# Patient Record
Sex: FEMALE | Race: BLACK OR AFRICAN AMERICAN | NOT HISPANIC OR LATINO | URBAN - METROPOLITAN AREA
[De-identification: names, ages, dates, MRNs, and addresses within clinical notes are randomized per-mention and may not be internally consistent; named-entity substitution may affect disease eponyms.]

---

## 2023-02-26 ENCOUNTER — INPATIENT (INPATIENT)
Facility: HOSPITAL | Age: 83
LOS: 8 days | Discharge: NURSING, REHAB OR LONG-TERM CARE | DRG: 315 | End: 2023-03-07
Attending: INTERNAL MEDICINE | Admitting: HOSPITALIST
Payer: MEDICARE

## 2023-02-26 VITALS
HEART RATE: 72 BPM | RESPIRATION RATE: 27 BRPM | TEMPERATURE: 98 F | DIASTOLIC BLOOD PRESSURE: 69 MMHG | OXYGEN SATURATION: 100 % | WEIGHT: 169.54 LBS | HEIGHT: 65 IN | SYSTOLIC BLOOD PRESSURE: 143 MMHG

## 2023-02-26 LAB
A1C WITH ESTIMATED AVERAGE GLUCOSE RESULT: 6.1 % — HIGH (ref 4–5.6)
ALBUMIN SERPL ELPH-MCNC: 3.5 G/DL — SIGNIFICANT CHANGE UP (ref 3.3–5)
ALBUMIN SERPL ELPH-MCNC: 3.8 G/DL — SIGNIFICANT CHANGE UP (ref 3.3–5)
ALP SERPL-CCNC: 56 U/L — SIGNIFICANT CHANGE UP (ref 40–120)
ALP SERPL-CCNC: 64 U/L — SIGNIFICANT CHANGE UP (ref 40–120)
ALT FLD-CCNC: 10 U/L — SIGNIFICANT CHANGE UP (ref 10–45)
ALT FLD-CCNC: 13 U/L — SIGNIFICANT CHANGE UP (ref 10–45)
ANION GAP SERPL CALC-SCNC: 11 MMOL/L — SIGNIFICANT CHANGE UP (ref 5–17)
ANION GAP SERPL CALC-SCNC: 9 MMOL/L — SIGNIFICANT CHANGE UP (ref 5–17)
ANISOCYTOSIS BLD QL: SLIGHT — SIGNIFICANT CHANGE UP
APPEARANCE UR: CLEAR — SIGNIFICANT CHANGE UP
APTT BLD: 30.6 SEC — SIGNIFICANT CHANGE UP (ref 27.5–35.5)
AST SERPL-CCNC: 16 U/L — SIGNIFICANT CHANGE UP (ref 10–40)
AST SERPL-CCNC: 17 U/L — SIGNIFICANT CHANGE UP (ref 10–40)
BACTERIA # UR AUTO: PRESENT /HPF
BASOPHILS # BLD AUTO: 0 K/UL — SIGNIFICANT CHANGE UP (ref 0–0.2)
BASOPHILS # BLD AUTO: 0.01 K/UL — SIGNIFICANT CHANGE UP (ref 0–0.2)
BASOPHILS NFR BLD AUTO: 0 % — SIGNIFICANT CHANGE UP (ref 0–2)
BASOPHILS NFR BLD AUTO: 0.2 % — SIGNIFICANT CHANGE UP (ref 0–2)
BILIRUB SERPL-MCNC: 0.2 MG/DL — SIGNIFICANT CHANGE UP (ref 0.2–1.2)
BILIRUB SERPL-MCNC: 0.2 MG/DL — SIGNIFICANT CHANGE UP (ref 0.2–1.2)
BILIRUB UR-MCNC: NEGATIVE — SIGNIFICANT CHANGE UP
BLD GP AB SCN SERPL QL: NEGATIVE — SIGNIFICANT CHANGE UP
BLD GP AB SCN SERPL QL: NEGATIVE — SIGNIFICANT CHANGE UP
BUN SERPL-MCNC: 39 MG/DL — HIGH (ref 7–23)
BUN SERPL-MCNC: 40 MG/DL — HIGH (ref 7–23)
CALCIUM SERPL-MCNC: 9.4 MG/DL — SIGNIFICANT CHANGE UP (ref 8.4–10.5)
CALCIUM SERPL-MCNC: 9.6 MG/DL — SIGNIFICANT CHANGE UP (ref 8.4–10.5)
CHLORIDE SERPL-SCNC: 106 MMOL/L — SIGNIFICANT CHANGE UP (ref 96–108)
CHLORIDE SERPL-SCNC: 107 MMOL/L — SIGNIFICANT CHANGE UP (ref 96–108)
CHOLEST SERPL-MCNC: 206 MG/DL — HIGH
CK MB CFR SERPL CALC: 1.6 NG/ML — SIGNIFICANT CHANGE UP (ref 0–6.7)
CK SERPL-CCNC: 73 U/L — SIGNIFICANT CHANGE UP (ref 25–170)
CO2 SERPL-SCNC: 25 MMOL/L — SIGNIFICANT CHANGE UP (ref 22–31)
CO2 SERPL-SCNC: 27 MMOL/L — SIGNIFICANT CHANGE UP (ref 22–31)
COLOR SPEC: YELLOW — SIGNIFICANT CHANGE UP
CREAT ?TM UR-MCNC: 95 MG/DL — SIGNIFICANT CHANGE UP
CREAT SERPL-MCNC: 1.71 MG/DL — HIGH (ref 0.5–1.3)
CREAT SERPL-MCNC: 1.8 MG/DL — HIGH (ref 0.5–1.3)
DIFF PNL FLD: NEGATIVE — SIGNIFICANT CHANGE UP
EGFR: 27 ML/MIN/1.73M2 — LOW
EGFR: 29 ML/MIN/1.73M2 — LOW
EOSINOPHIL # BLD AUTO: 0.23 K/UL — SIGNIFICANT CHANGE UP (ref 0–0.5)
EOSINOPHIL # BLD AUTO: 0.35 K/UL — SIGNIFICANT CHANGE UP (ref 0–0.5)
EOSINOPHIL NFR BLD AUTO: 4.2 % — SIGNIFICANT CHANGE UP (ref 0–6)
EOSINOPHIL NFR BLD AUTO: 6.2 % — HIGH (ref 0–6)
EPI CELLS # UR: ABNORMAL /HPF (ref 0–5)
ESTIMATED AVERAGE GLUCOSE: 128 MG/DL — HIGH (ref 68–114)
FERRITIN SERPL-MCNC: 191 NG/ML — HIGH (ref 15–150)
GIANT PLATELETS BLD QL SMEAR: PRESENT — SIGNIFICANT CHANGE UP
GLUCOSE BLDC GLUCOMTR-MCNC: 118 MG/DL — HIGH (ref 70–99)
GLUCOSE BLDC GLUCOMTR-MCNC: 125 MG/DL — HIGH (ref 70–99)
GLUCOSE BLDC GLUCOMTR-MCNC: 261 MG/DL — HIGH (ref 70–99)
GLUCOSE BLDC GLUCOMTR-MCNC: 281 MG/DL — HIGH (ref 70–99)
GLUCOSE SERPL-MCNC: 122 MG/DL — HIGH (ref 70–99)
GLUCOSE SERPL-MCNC: 180 MG/DL — HIGH (ref 70–99)
GLUCOSE UR QL: NEGATIVE — SIGNIFICANT CHANGE UP
HCT VFR BLD CALC: 27.4 % — LOW (ref 34.5–45)
HCT VFR BLD CALC: 29.4 % — LOW (ref 34.5–45)
HDLC SERPL-MCNC: 43 MG/DL — LOW
HGB BLD-MCNC: 8.8 G/DL — LOW (ref 11.5–15.5)
HGB BLD-MCNC: 9.6 G/DL — LOW (ref 11.5–15.5)
HYPOCHROMIA BLD QL: SLIGHT — SIGNIFICANT CHANGE UP
IMM GRANULOCYTES NFR BLD AUTO: 0.2 % — SIGNIFICANT CHANGE UP (ref 0–0.9)
INR BLD: 1.03 — SIGNIFICANT CHANGE UP (ref 0.88–1.16)
IRON SATN MFR SERPL: 28 % — SIGNIFICANT CHANGE UP (ref 14–50)
IRON SATN MFR SERPL: 62 UG/DL — SIGNIFICANT CHANGE UP (ref 30–160)
KETONES UR-MCNC: NEGATIVE — SIGNIFICANT CHANGE UP
LACTATE SERPL-SCNC: 0.5 MMOL/L — SIGNIFICANT CHANGE UP (ref 0.5–2)
LACTATE SERPL-SCNC: 0.5 MMOL/L — SIGNIFICANT CHANGE UP (ref 0.5–2)
LEUKOCYTE ESTERASE UR-ACNC: ABNORMAL
LIPID PNL WITH DIRECT LDL SERPL: 131 MG/DL — HIGH
LYMPHOCYTES # BLD AUTO: 1.44 K/UL — SIGNIFICANT CHANGE UP (ref 1–3.3)
LYMPHOCYTES # BLD AUTO: 2.47 K/UL — SIGNIFICANT CHANGE UP (ref 1–3.3)
LYMPHOCYTES # BLD AUTO: 25.7 % — SIGNIFICANT CHANGE UP (ref 13–44)
LYMPHOCYTES # BLD AUTO: 45.2 % — HIGH (ref 13–44)
MACROCYTES BLD QL: SLIGHT — SIGNIFICANT CHANGE UP
MAGNESIUM SERPL-MCNC: 1.8 MG/DL — SIGNIFICANT CHANGE UP (ref 1.6–2.6)
MAGNESIUM SERPL-MCNC: 2 MG/DL — SIGNIFICANT CHANGE UP (ref 1.6–2.6)
MANUAL SMEAR VERIFICATION: SIGNIFICANT CHANGE UP
MCHC RBC-ENTMCNC: 30.4 PG — SIGNIFICANT CHANGE UP (ref 27–34)
MCHC RBC-ENTMCNC: 31 PG — SIGNIFICANT CHANGE UP (ref 27–34)
MCHC RBC-ENTMCNC: 32.1 GM/DL — SIGNIFICANT CHANGE UP (ref 32–36)
MCHC RBC-ENTMCNC: 32.7 GM/DL — SIGNIFICANT CHANGE UP (ref 32–36)
MCV RBC AUTO: 94.8 FL — SIGNIFICANT CHANGE UP (ref 80–100)
MCV RBC AUTO: 94.8 FL — SIGNIFICANT CHANGE UP (ref 80–100)
MICROCYTES BLD QL: SLIGHT — SIGNIFICANT CHANGE UP
MONOCYTES # BLD AUTO: 0.2 K/UL — SIGNIFICANT CHANGE UP (ref 0–0.9)
MONOCYTES # BLD AUTO: 0.53 K/UL — SIGNIFICANT CHANGE UP (ref 0–0.9)
MONOCYTES NFR BLD AUTO: 3.5 % — SIGNIFICANT CHANGE UP (ref 2–14)
MONOCYTES NFR BLD AUTO: 9.7 % — SIGNIFICANT CHANGE UP (ref 2–14)
NEUTROPHILS # BLD AUTO: 2.21 K/UL — SIGNIFICANT CHANGE UP (ref 1.8–7.4)
NEUTROPHILS # BLD AUTO: 3.62 K/UL — SIGNIFICANT CHANGE UP (ref 1.8–7.4)
NEUTROPHILS NFR BLD AUTO: 40.5 % — LOW (ref 43–77)
NEUTROPHILS NFR BLD AUTO: 64.6 % — SIGNIFICANT CHANGE UP (ref 43–77)
NITRITE UR-MCNC: NEGATIVE — SIGNIFICANT CHANGE UP
NON HDL CHOLESTEROL: 163 MG/DL — HIGH
NRBC # BLD: 0 /100 WBCS — SIGNIFICANT CHANGE UP (ref 0–0)
OSMOLALITY UR: 369 MOSM/KG — SIGNIFICANT CHANGE UP (ref 300–900)
OVALOCYTES BLD QL SMEAR: SLIGHT — SIGNIFICANT CHANGE UP
PH UR: 5.5 — SIGNIFICANT CHANGE UP (ref 5–8)
PHOSPHATE SERPL-MCNC: 3.3 MG/DL — SIGNIFICANT CHANGE UP (ref 2.5–4.5)
PHOSPHATE SERPL-MCNC: 3.4 MG/DL — SIGNIFICANT CHANGE UP (ref 2.5–4.5)
PLAT MORPH BLD: ABNORMAL
PLATELET # BLD AUTO: 140 K/UL — LOW (ref 150–400)
PLATELET # BLD AUTO: 158 K/UL — SIGNIFICANT CHANGE UP (ref 150–400)
POIKILOCYTOSIS BLD QL AUTO: SLIGHT — SIGNIFICANT CHANGE UP
POLYCHROMASIA BLD QL SMEAR: SLIGHT — SIGNIFICANT CHANGE UP
POTASSIUM SERPL-MCNC: 4 MMOL/L — SIGNIFICANT CHANGE UP (ref 3.5–5.3)
POTASSIUM SERPL-MCNC: 4 MMOL/L — SIGNIFICANT CHANGE UP (ref 3.5–5.3)
POTASSIUM SERPL-SCNC: 4 MMOL/L — SIGNIFICANT CHANGE UP (ref 3.5–5.3)
POTASSIUM SERPL-SCNC: 4 MMOL/L — SIGNIFICANT CHANGE UP (ref 3.5–5.3)
PROT ?TM UR-MCNC: 26 MG/DL — HIGH (ref 0–12)
PROT SERPL-MCNC: 7 G/DL — SIGNIFICANT CHANGE UP (ref 6–8.3)
PROT SERPL-MCNC: 7.6 G/DL — SIGNIFICANT CHANGE UP (ref 6–8.3)
PROT UR-MCNC: ABNORMAL MG/DL
PROT/CREAT UR-RTO: 0.3 RATIO — HIGH (ref 0–0.2)
PROTHROM AB SERPL-ACNC: 12.3 SEC — SIGNIFICANT CHANGE UP (ref 10.5–13.4)
RAPID RVP RESULT: SIGNIFICANT CHANGE UP
RBC # BLD: 2.89 M/UL — LOW (ref 3.8–5.2)
RBC # BLD: 2.89 M/UL — LOW (ref 3.8–5.2)
RBC # BLD: 3.1 M/UL — LOW (ref 3.8–5.2)
RBC # FLD: 16.3 % — HIGH (ref 10.3–14.5)
RBC # FLD: 16.7 % — HIGH (ref 10.3–14.5)
RBC BLD AUTO: ABNORMAL
RBC CASTS # UR COMP ASSIST: < 5 /HPF — SIGNIFICANT CHANGE UP
RETICS #: 50.6 K/UL — SIGNIFICANT CHANGE UP (ref 25–125)
RETICS/RBC NFR: 1.8 % — SIGNIFICANT CHANGE UP (ref 0.5–2.5)
RH IG SCN BLD-IMP: POSITIVE — SIGNIFICANT CHANGE UP
RH IG SCN BLD-IMP: POSITIVE — SIGNIFICANT CHANGE UP
SARS-COV-2 RNA SPEC QL NAA+PROBE: SIGNIFICANT CHANGE UP
SCHISTOCYTES BLD QL AUTO: SLIGHT — SIGNIFICANT CHANGE UP
SMUDGE CELLS # BLD: PRESENT — SIGNIFICANT CHANGE UP
SODIUM SERPL-SCNC: 142 MMOL/L — SIGNIFICANT CHANGE UP (ref 135–145)
SODIUM SERPL-SCNC: 143 MMOL/L — SIGNIFICANT CHANGE UP (ref 135–145)
SODIUM UR-SCNC: 47 MMOL/L — SIGNIFICANT CHANGE UP
SP GR SPEC: 1.02 — SIGNIFICANT CHANGE UP (ref 1–1.03)
SPHEROCYTES BLD QL SMEAR: SLIGHT — SIGNIFICANT CHANGE UP
T4 AB SER-ACNC: 5.97 UG/DL — SIGNIFICANT CHANGE UP (ref 4.5–11.7)
TARGETS BLD QL SMEAR: SLIGHT — SIGNIFICANT CHANGE UP
TIBC SERPL-MCNC: 223 UG/DL — SIGNIFICANT CHANGE UP (ref 220–430)
TRIGL SERPL-MCNC: 158 MG/DL — HIGH
TROPONIN T SERPL-MCNC: 0.03 NG/ML — HIGH (ref 0–0.01)
TSH SERPL-MCNC: 3.52 UIU/ML — SIGNIFICANT CHANGE UP (ref 0.27–4.2)
UIBC SERPL-MCNC: 161 UG/DL — SIGNIFICANT CHANGE UP (ref 110–370)
URATE SERPL-MCNC: 5.2 MG/DL — SIGNIFICANT CHANGE UP (ref 2.5–7)
UROBILINOGEN FLD QL: 0.2 E.U./DL — SIGNIFICANT CHANGE UP
UUN UR-MCNC: 583 MG/DL — SIGNIFICANT CHANGE UP
WBC # BLD: 5.46 K/UL — SIGNIFICANT CHANGE UP (ref 3.8–10.5)
WBC # BLD: 5.6 K/UL — SIGNIFICANT CHANGE UP (ref 3.8–10.5)
WBC # FLD AUTO: 5.46 K/UL — SIGNIFICANT CHANGE UP (ref 3.8–10.5)
WBC # FLD AUTO: 5.6 K/UL — SIGNIFICANT CHANGE UP (ref 3.8–10.5)
WBC UR QL: > 10 /HPF

## 2023-02-26 PROCEDURE — 33016 PERICARDIOCENTESIS W/IMAGING: CPT

## 2023-02-26 PROCEDURE — 93308 TTE F-UP OR LMTD: CPT | Mod: 26

## 2023-02-26 PROCEDURE — 99233 SBSQ HOSP IP/OBS HIGH 50: CPT

## 2023-02-26 PROCEDURE — 99222 1ST HOSP IP/OBS MODERATE 55: CPT

## 2023-02-26 PROCEDURE — 71045 X-RAY EXAM CHEST 1 VIEW: CPT | Mod: 26

## 2023-02-26 PROCEDURE — 99223 1ST HOSP IP/OBS HIGH 75: CPT

## 2023-02-26 RX ORDER — PANTOPRAZOLE SODIUM 20 MG/1
40 TABLET, DELAYED RELEASE ORAL
Refills: 0 | Status: DISCONTINUED | OUTPATIENT
Start: 2023-02-26 | End: 2023-03-07

## 2023-02-26 RX ORDER — ACETAMINOPHEN 500 MG
1000 TABLET ORAL ONCE
Refills: 0 | Status: COMPLETED | OUTPATIENT
Start: 2023-02-26 | End: 2023-02-26

## 2023-02-26 RX ORDER — NIFEDIPINE 30 MG
60 TABLET, EXTENDED RELEASE 24 HR ORAL EVERY 24 HOURS
Refills: 0 | Status: DISCONTINUED | OUTPATIENT
Start: 2023-02-26 | End: 2023-02-27

## 2023-02-26 RX ORDER — DEXTROSE 50 % IN WATER 50 %
25 SYRINGE (ML) INTRAVENOUS ONCE
Refills: 0 | Status: DISCONTINUED | OUTPATIENT
Start: 2023-02-26 | End: 2023-03-07

## 2023-02-26 RX ORDER — ATORVASTATIN CALCIUM 80 MG/1
20 TABLET, FILM COATED ORAL AT BEDTIME
Refills: 0 | Status: DISCONTINUED | OUTPATIENT
Start: 2023-02-26 | End: 2023-03-07

## 2023-02-26 RX ORDER — CHLORHEXIDINE GLUCONATE 213 G/1000ML
1 SOLUTION TOPICAL
Refills: 0 | Status: DISCONTINUED | OUTPATIENT
Start: 2023-02-26 | End: 2023-03-05

## 2023-02-26 RX ORDER — DEXTROSE 50 % IN WATER 50 %
12.5 SYRINGE (ML) INTRAVENOUS ONCE
Refills: 0 | Status: DISCONTINUED | OUTPATIENT
Start: 2023-02-26 | End: 2023-03-07

## 2023-02-26 RX ORDER — SODIUM CHLORIDE 9 MG/ML
1000 INJECTION, SOLUTION INTRAVENOUS
Refills: 0 | Status: DISCONTINUED | OUTPATIENT
Start: 2023-02-26 | End: 2023-03-07

## 2023-02-26 RX ORDER — GLUCAGON INJECTION, SOLUTION 0.5 MG/.1ML
1 INJECTION, SOLUTION SUBCUTANEOUS ONCE
Refills: 0 | Status: DISCONTINUED | OUTPATIENT
Start: 2023-02-26 | End: 2023-03-07

## 2023-02-26 RX ORDER — INFLUENZA VIRUS VACCINE 15; 15; 15; 15 UG/.5ML; UG/.5ML; UG/.5ML; UG/.5ML
0.7 SUSPENSION INTRAMUSCULAR ONCE
Refills: 0 | Status: DISCONTINUED | OUTPATIENT
Start: 2023-02-26 | End: 2023-03-07

## 2023-02-26 RX ORDER — INSULIN LISPRO 100/ML
VIAL (ML) SUBCUTANEOUS
Refills: 0 | Status: DISCONTINUED | OUTPATIENT
Start: 2023-02-26 | End: 2023-03-07

## 2023-02-26 RX ORDER — DEXTROSE 50 % IN WATER 50 %
15 SYRINGE (ML) INTRAVENOUS ONCE
Refills: 0 | Status: DISCONTINUED | OUTPATIENT
Start: 2023-02-26 | End: 2023-03-07

## 2023-02-26 RX ADMIN — Medication 60 MILLIGRAM(S): at 07:09

## 2023-02-26 RX ADMIN — ATORVASTATIN CALCIUM 20 MILLIGRAM(S): 80 TABLET, FILM COATED ORAL at 22:28

## 2023-02-26 RX ADMIN — CHLORHEXIDINE GLUCONATE 1 APPLICATION(S): 213 SOLUTION TOPICAL at 06:21

## 2023-02-26 RX ADMIN — Medication 1000 MILLIGRAM(S): at 19:15

## 2023-02-26 RX ADMIN — Medication 6: at 22:27

## 2023-02-26 RX ADMIN — Medication 400 MILLIGRAM(S): at 19:00

## 2023-02-26 RX ADMIN — PANTOPRAZOLE SODIUM 40 MILLIGRAM(S): 20 TABLET, DELAYED RELEASE ORAL at 06:21

## 2023-02-26 NOTE — PROGRESS NOTE ADULT - SUBJECTIVE AND OBJECTIVE BOX
*incomplete* TU ADKINS 83y Female  MRN#: [id number]     SUBJECTIVE   Today is hospital day , and this morning she is reporting no SOB, CP, lightheadedness. In addition this patient does not report any nausea, vomiting or diarrhea  Overnight the patient noted to have early tamponade physiology per imaging w  RA early diastolic collapse, however RAND collapsable.     OBJECTIVE  PAST MEDICAL & SURGICAL HISTORY    Home Meds:    ALLERGIES:  Allergy Status Unknown    MEDICATIONS:  STANDING MEDICATIONS  atorvastatin 20 milliGRAM(s) Oral at bedtime  chlorhexidine 2% Cloths 1 Application(s) Topical <User Schedule>  influenza  Vaccine (HIGH DOSE) 0.7 milliLiter(s) IntraMuscular once  NIFEdipine XL 60 milliGRAM(s) Oral every 24 hours  pantoprazole    Tablet 40 milliGRAM(s) Oral before breakfast    PRN MEDICATIONS      VITAL SIGNS: Last 24 Hours  T(C): 37 (2023 09:50), Max: 37 (2023 09:50)  T(F): 98.6 (2023 09:50), Max: 98.6 (2023 09:50)  HR: 77 (2023 14:00) (60 - 83)  BP: 147/64 (2023 14:00) (123/61 - 161/71)  BP(mean): 92 (2023 14:00) (88 - 107)  RR: 20 (2023 14:00) (17 - 29)  SpO2: 100% (2023 14:00) (98% - 100%)    LABS:                        8.8    5.60  )-----------( 140      ( 2023 10:16 )             27.4         143  |  107  |  39<H>  ----------------------------<  122<H>  4.0   |  25  |  1.71<H>    Ca    9.4      2023 10:16  Phos  3.3       Mg     1.8         TPro  7.0  /  Alb  3.5  /  TBili  0.2  /  DBili  x   /  AST  16  /  ALT  10  /  AlkPhos  56  26    PT/INR - ( 2023 03:35 )   PT: 12.3 sec;   INR: 1.03          PTT - ( 2023 03:35 )  PTT:30.6 sec  Urinalysis Basic - ( 2023 07:55 )    Color: Yellow / Appearance: Clear / S.020 / pH: x  Gluc: x / Ketone: NEGATIVE  / Bili: Negative / Urobili: 0.2 E.U./dL   Blood: x / Protein: Trace mg/dL / Nitrite: NEGATIVE   Leuk Esterase: Moderate / RBC: < 5 /HPF / WBC > 10 /HPF   Sq Epi: x / Non Sq Epi: 5-10 /HPF / Bacteria: Present /HPF    Lactate, Blood: 0.5 mmol/L (23 @ 10:16)  Lactate, Blood: 0.5 mmol/L (23 @ 03:35)  Creatine Kinase, Serum: 73 U/L (23 @ 03:35)  Troponin T, Serum: 0.03 ng/mL *H* (23 @ 03:35)      CARDIAC MARKERS ( 2023 03:35 )  x     / 0.03 ng/mL / 73 U/L / x     / 1.6 ng/mL      PHYSICAL EXAM:  GENERAL: NAD, lying in bed comfortably  HEAD:  Atraumatic, Normocephalic  EYES: EOMI, PERRLA, conjunctiva and sclera clear  ENT: Moist mucous membranes  NECK: Supple, No JVD  CHEST/LUNG: Clear to auscultation bilaterally; No rales, rhonchi, wheezing, or rubs. Unlabored respirations  HEART: Regular rate and rhythm; No murmurs, rubs, or gallops  ABDOMEN: Bowel sounds present; Soft, Nontender, Nondistended. No hepatomegaly  EXTREMITIES:  2+ Peripheral Pulses, brisk capillary refill. No clubbing, cyanosis, or edema  NERVOUS SYSTEM:  Alert & Oriented X3, speech clear. No deficits   MSK: FROM all 4 extremities, full and equal strength  SKIN: No rashes or lesions

## 2023-02-26 NOTE — PATIENT PROFILE ADULT - NSPROHMSYMPCOND_GEN_A_NUR
Please review. Protocol Failed or has No Protocol.     Requested Prescriptions   Pending Prescriptions Disp Refills    HYDROCHLOROTHIAZIDE 25 MG Oral Tab [Pharmacy Med Name: hydroCHLOROthiazide Oral Tablet 25 MG] 90 tablet 0     Sig: TAKE 1 TABLET BY MOUTH DAILY        Hypertensive Medications Protocol Failed - 9/10/2022  9:53 AM        Failed - Last BP reading less than 140/90     BP Readings from Last 1 Encounters:  07/21/22 : (!) 145/94                Passed - In person appointment in the past 12 or next 3 months       Recent Outpatient Visits              1 month ago Subacute cough    [facility], [place] [facility]    Office Visit    5 months ago Encounter for annual health examination    [facility], [address] [place], [place] DO    Office Visit    1 year ago Nail dystrophy    [facility], [address] [facility],[address], [address] [facility] [place] D, [facility]    Office Visit    1 year ago Essential hypertension    [facility], [place], [place] DO    Office Visit    2 years ago Essential hypertension    [facility], [place], [place]    Telemedicine                 Passed - CMP or BMP in past 6 months     Recent Results (from the past 4392 hour(s))   COMP METABOLIC PANEL (14)    Collection Time: 03/15/22 12:16 PM   Result Value Ref Range    Glucose 85 70 - 99 mg/dL    Sodium 136 136 - 145 mmol/L    Potassium 3.1 (L) 3.5 - 5.1 mmol/L    Chloride 103 98 - 112 mmol/L    CO2 28.0 21.0 - 32.0 mmol/L    Anion Gap 5 0 - 18 mmol/L    BUN 19 (H) 7 - 18 mg/dL    Creatinine 1.06 0.70 - 1.30 mg/dL    BUN/CREA Ratio 17.9 10.0 - 20.0    Calcium, Total 9.3 8.5 - 10.1 mg/dL    Calculated Osmolality 284 275 - 295 mOsm/kg    GFR, Non- 72 >=60    GFR, -American 83 >=60    ALT 29 16 - 61 U/L    AST 18 15 - 37 U/L    Alkaline Phosphatase 43 (L) 45 - 117 U/L    Bilirubin, Total 0.9 0.1 - 2.0 mg/dL    Total Protein 7.3 6.4 - 8.2 g/dL    Albumin 4.0 3.4 - 5.0 g/dL    Globulin  3.3 2.8 - 4.4 g/dL    A/G Ratio 1.2 1.0 - 2.0    Patient Fasting for CMP? Yes      *Note: Due to a large number of results and/or encounters for the requested time period, some results have not been displayed. A complete set of results can be found in Results Review.                  Passed - In person appointment or virtual visit in the past 6 months       Recent Outpatient Visits              1 month ago Subacute cough    [facility], [address], [place], [place], [place]    Office Visit    5 months ago Encounter for annual health examination    [facility], [address], [place], [place], DO    Office Visit    1 year ago Nail dystrophy    [facility], [address], [address] [facility], DPM    Office Visit    1 year ago Essential hypertension    [facility], [address], [place], [place], DO    Office Visit    2 years ago Essential hypertension    [facility], [address], [place], [address] - GFR > 50     No results found for: [facility]                    Recent Outpatient Visits              1 month ago Subacute cough    [facility], [address], [place], [place], DO    Office Visit    5 months ago Encounter for annual health examination    [facility], [address], [place], [place], DO    Office Visit    1 year ago Nail dystrophy    [facility], [address], [address], [place], DPM    Office Visit    1 year ago Essential hypertension    [facility], [address], [place], [place], DO    Office Visit    2 years ago Essential hypertension    [facility], [address], [place], [place], [place]    Telemedicine cardiovascular/diabetes/sleep

## 2023-02-26 NOTE — H&P ADULT - ASSESSMENT
82 y/o F PMH CHF (unknown previous EF), HTN, DM, PVD, stage 3CKD, CHF, CATE (~4L O2 at home) gout, recurrent pericardial effusion, unclear smoking hx, who presented to [facility] for hypoglycemia, found to have pericardial effusion, transfferred to [facility] for further management.      NEURO  JOSE J    CV  #Pericardial effusion    IVC collapsable, RA early diastolic collapse  early cardiac tamponade physiology      #PVD  Hx of LE PVD. On [id number] and Atorvastatin 20mg. Per pt, no hx of stents. No current symptoms.   Plan:  -c/w Atorvastatin 20mg qd      PULM      GI      RENAL  #CKD stage III      HEME/ONC      ID      F:As needed  E:replete prn  N:NPO  A:   82 y/o F PMH CHF (unknown previous EF), HTN, DM, PVD, stage 3CKD, CHF, CATE (~4L O2 at home) gout, recurrent pericardial effusion, unclear smoking hx, who presented to [facility] for hypoglycemia, found to have pericardial effusion, transferred to [facility] for further management.      NEURO  JOSE J      CV  #Pericardial effusion  Pt transferred to [facility] with incidental pericardial effusion found at [facility]. Per daughter at bedside, pt underwent pericardiocentesis in 07/2021 in [place].  Bedside echo 2/26: IVC collapsable, RA early diastolic collapse, early cardiac tamponade physiology.  Plan:  -interventional cardiology management for      #PVD  Hx of LE PVD. On [id number] and Atorvastatin 20mg. Per pt, no hx of stents. No current symptoms.   Plan:  -c/w Atorvastatin 20mg qd      PULM  #CATE  Pt with hx of CATE, on home O2, around 4L  Plan:  -satting 99% on NC 2L,   -c/w RA      GI  JOSE J    RENAL  #CKD stage III        HEME/ONC      ID      F:As needed  E:replete prn  N:NPO  A:   82 y/o F PMH CHF (unknown previous EF), HTN, DM, PVD, stage 3CKD, CHF, CATE (~4L O2 at home) gout, recurrent pericardial effusion, unclear smoking hx, who presented to [facility] for hypoglycemia, found to have pericardial effusion, transferred to [facility] for further management.      NEURO  JOSE J      CV  #Pericardial effusion  Pt transferred to [facility] with incidental pericardial effusion found at [facility]. Per daughter at bedside, pt underwent pericardiocentesis in 07/2021 in [place].  Bedside echo 2/26: IVC collapsable, RA early diastolic collapse, early cardiac tamponade physiology.  Plan:  -interventional cardiology management for potential pericardiocentesis vs pericardial window  -hold lasix     #CHF  Pt with hx of CHF, unknown last EF. On home carvedilol 12.5 mg BID, enalapril 10mg. BNP and trop WNL at [place]. Not in current exacerbation. Extremities WWP.  Plan:  -hold home carvedilol and enalapril      #PVD  Hx of LE PVD. On [id number] and Atorvastatin 20mg. Per pt, no hx of stents. No current symptoms.   Plan:  -c/w Atorvastatin 20mg qd  -hold ASA for potential procedure tomorrow      #HTN  Pt with hx of HTN, on home nifedipine 60mg daily  -c/w nifedipine 60mg daily    #Elevated troponin  pt with trop 0.03, denies CP, no ischemic findings on EKG. CK, CKMB WNL.  Plan:  -monitor daily EKG    PULM  #CATE  Pt with hx of CATE, on home O2, around 4L  Plan:  -satting 99% on NC 2L,   -c/w RA      GI  #GERD  Pt with hx of GERD, on home pantoprazole 40mg daily  Plan:  -c.w pantoprazole 40mg daily      RENAL  #CKD stage III  Pt with Cr 1.8 on admission, 2.13 today at [facility].  Plan:  -trend BUN/Cr daily      HEME/ONC  #Anemia  Hgb 9.6, likely from CKD. No signs of active bleeding. Denies melana, hamturia, hemoptysis.  Plan:  -f/u iron panel  -maintain active T&S  -transfuse hgb<7    ID  JOSE J    F:As needed  E:replete prn  N:NPO  A:none   82 y/o F PMH CHF (unknown previous EF), HTN, DM, PVD, stage 3CKD, CHF, CATE (~4L O2 at home) gout, recurrent pericardial effusion, unclear smoking hx, who presented to [facility] for hypoglycemia, found to have pericardial effusion, transferred to [facility] for further management.      NEURO  JOSE J      CV  #Pericardial effusion  Pt transferred to [facility] with incidental pericardial effusion found at [facility]. Per daughter at bedside, pt underwent pericardiocentesis in 07/2021 in [place]. On home lasix 40mg BID. Unknown etiology. Pt appears euvolemic.  Bedside echo 2/26: IVC collapsable, RA early diastolic collapse, early cardiac tamponade physiology.  Plan:  -interventional cardiology management for potential pericardiocentesis vs pericardial window  -hold lasix       #CHF  Pt with hx of CHF, unknown last EF. On home carvedilol 12.5 mg BID, enalapril 10mg. BNP and trop WNL at [place]. Not in current exacerbation. Extremities WWP.  Plan:  -hold home carvedilol and enalapril      #PVD  Hx of LE PVD. On [id number] and Atorvastatin 20mg. Per pt, no hx of stents. No current symptoms.   Plan:  -c/w Atorvastatin 20mg qd  -hold ASA for potential procedure tomorrow      #HTN  Pt with hx of HTN, on home nifedipine 60mg daily  -c/w nifedipine 60mg daily    #Elevated troponin  pt with trop 0.03, denies CP, no ischemic findings on EKG. CK, CKMB WNL.  Plan:  -monitor daily EKG    PULM  #CATE  Pt with hx of CATE, on home O2, around 4L  Plan:  -satting 99% on NC 2L,   -c/w RA      GI  #GERD  Pt with hx of GERD, on home pantoprazole 40mg daily  Plan:  -c.w pantoprazole 40mg daily      RENAL  #CKD stage III  Pt with Cr 1.8 on admission, 2.13 today at [place].  Plan:  -trend BUN/Cr daily    ENDO  #Dm  Admitted to [place] for hypoglycemia to reported 49mg/dl, 39mg/dL, then 29 mg/dL on home finger stick. On home glipizide 10mg qd, Sitagliptin 50mg.  Plan:  -started on sliding scale lispro  -monitor FSG q6    HEME/ONC  #Anemia  Hgb 9.6, likely from CKD. No signs of active bleeding. Denies melana, hamturia, hemoptysis.  Plan:  -f/u iron panel  -maintain active T&S  -transfuse hgb<7    ID  JOSE J    F:As needed  E:replete prn  N:NPO  A:none

## 2023-02-26 NOTE — H&P ADULT - HISTORY OF PRESENT ILLNESS
82 y/o F PMH CHF (unknown previous EF), HTN, DM, stage 3CKD, CHF, CATE (~4L O2 at home) gout, recurrent pericardial efffusion, unclear smoking hx, who presented to [facility] for weakness, near syncopal episode, decreased appetite, dizziness, warmth, and nausea, reporting fingertstick at home 49mg/dl, 39mg/dL, then 29 mg/dL, admitted to [place] for hypoglycemia. Pt developed tachypnea in ED, PE showed BL crackes, bedside echo at [place] showed pericardial effusion. Vitals at [place] during admission /63, HR 85, O2 95%. EKG showed NSR, LBBB and electrical alternans which was confirmed by telemetry. Pt remained HDS at [place].    Per daughter at bedside, pt last had an episode of pericardial effusion in 07/2021 in [place] and underwent pericardiocentesis with an unknown etiology. Of note, pt was recently hospitalized on 2/13 at [facility] for SOB, BL LE edema, her home lasix 40mg was increased to q12 from qd, limited by Cr/BUN 2.13, 52. Transferred to [facility] for pericardiocentesis vs pericardial window.     EKG: WV interval 296, 1st degree AV block, PVCs   84 y/o F PMH CHF (unknown previous EF), HTN, DM, PVD, stage 3CKD, CHF, CATE (~4L O2 at home) gout, recurrent pericardial effusion, unclear smoking hx, who presented to [facility] for weakness, near syncopal episode, decreased appetite, dizziness, warmth, and nausea, reporting fingerstick at home 49mg/dl, 39mg/dL, then 29 mg/dL, admitted to [place] for hypoglycemia. Pt developed tachypnea in ED, PE showed BL crackles, bedside echo at [place] showed pericardial effusion. Vitals at [place] during admission /63, HR 85, O2 95%. EKG showed NSR, LBBB and electrical alternans which was confirmed by telemetry BNP 75, Cr 2.13/BUN 52, Trop T 13.7,  Pt remained HDS at [place].      Per daughter at bedside, pt last had an episode of pericardial effusion in 07/2021 in [place] and underwent pericardiocentesis with an unknown etiology. Of note, pt was recently hospitalized on 2/13 at [facility] for SOB, BL LE edema, her home lasix 40mg was increased to q12 from qd, limited by Cr/BUN 2.13/ 52. Transferred to [facility] for further management with pericardiocentesis vs pericardial window.     EKG: MN interval 296, 1st degree AV block, PVCs   84 y/o F PMH CHF (unknown previous EF), HTN, DM, PVD, stage 3CKD, CHF, CATE (~4L O2 at home) gout, recurrent pericardial effusion, unclear smoking hx, who presented to [facility] for weakness, near syncopal episode, decreased appetite, dizziness, warmth, and nausea, reporting fingerstick at home 49mg/dl, 39mg/dL, then 29 mg/dL, admitted to [place] for hypoglycemia. Pt developed tachypnea in ED, PE showed BL crackles, bedside echo at [place] showed pericardial effusion. Vitals at [place] during admission /63, HR 85, O2 95%. EKG showed NSR, LBBB and electrical alternans which was confirmed by telemetry BNP 75, Cr 2.13/BUN 52, Trop T 13.7,  Pt remained HDS at [place].      Per daughter at bedside, pt last had an episode of pericardial effusion in 07/2021 in [place] and underwent pericardiocentesis with an unknown etiology. Of note, pt was recently hospitalized on 2/13 at [facility] for SOB, BL LE edema, her home lasix 40mg was increased to q12 from qd, limited by Cr/BUN 2.13/ 52. Transferred to [facility] for further management with pericardiocentesis vs pericardial window.       EKG: HI interval 296, 1st degree AV block, PVCs

## 2023-02-26 NOTE — CONSULT NOTE ADULT - ASSESSMENT
Assesment:  82 y/o F PMH CHF (unknown previous EF), HTN, DM, PVD, stage 3CKD, CHF, CATE (~4L O2 at home) gout, recurrent pericardial effusion, former smoking hx, who presented to [facility] for weakness, near syncopal episode, decreased appetite, dizziness, warmth, and nausea, reporting fingerstick at home 49mg/dl, 39mg/dL, then 29 mg/dL, admitted to [place] for hypoglycemia. Patient developed tachypnea in ED, noted to have BL crackles, bedside echo at [place] showed large pericardial effusion. Vitals at [place] during admission /63, HR 85, O2 95%. EKG showed NSR, LBBB and electrical alternans which was confirmed by telemetry BNP 75, Cr 2.13/BUN 52, Trop T 13.7,  Patient remained HDS at [place].  Per daughter at bedside, patient last had an episode of pericardial effusion in 07/2021 in [place] and underwent pericardiocentesis with an unknown etiology. Of note, pt was recently hospitalized on 2/13 at [facility] for SOB, BL LE edema, her home lasix 40mg was increased to q12 from qd, limited by Cr/BUN 2.13/ 52. Transferred to [facility] for further management of pericardial effusion.  Structural heart consulted for evaluation of pericardial effusion.    Plan:  Problem 1: Large pericardial effusion  ECHO reviewed with Dr. Lee  There is not an adequate window to drain effusion.  There is also no evidence of tamponade physiology  Recommend repeat echo on monday, Feb 27th to evaluate for worsening effusion  Will continue to follow    Problem 2: Diabetes  Recommend insulin sliding scale while in house    Problem 3: Hypertension  Recommend caution with antihypertensives given pericardial effusion    Problem 4: Hyperlipidemia  Continue statin    I have reviewed clinical labs tests and reports, radiology tests and reports, as well as old patient medical records, and discussed with the referring physician.     Assesment:  84 y/o F PMH CHF (unknown previous EF), HTN, DM, PVD, stage 3CKD, CHF, CATE (~4L O2 at home) gout, recurrent pericardial effusion, former smoking hx, who presented to [facility] for weakness, near syncopal episode, decreased appetite, dizziness, warmth, and nausea, reporting fingerstick at home 49mg/dl, 39mg/dL, then 29 mg/dL, admitted to [place] for hypoglycemia. Patient developed tachypnea in ED, noted to have BL crackles, bedside echo at [place] showed large pericardial effusion. Vitals at [place] during admission /63, HR 85, O2 95%. EKG showed NSR, LBBB and electrical alternans which was confirmed by telemetry BNP 75, Cr 2.13/BUN 52, Trop T 13.7,  Patient remained HDS at [place].  Per daughter at bedside, patient last had an episode of pericardial effusion in 07/2021 in [place] and underwent pericardiocentesis with an unknown etiology. Of note, pt was recently hospitalized on 2/13 at [facility] for SOB, BL LE edema, her home lasix 40mg was increased to q12 from qd, limited by Cr/BUN 2.13/ 52. Transferred to [facility] for further management of pericardial effusion.  Structural heart consulted for evaluation of pericardial effusion.    Plan:  Problem 1: Large pericardial effusion  ECHO reviewed with Dr. Lee. On repeat bedside here at [facility], effusion appears larger and with echocardiographic evidence of tamponade. Good window to tap. Family and pt aware and agree. Procedure to be done in the cath lab  Will continue to follow    Problem 2: Diabetes  Recommend insulin sliding scale while in house    Problem 3: Hypertension  Recommend caution with antihypertensives given pericardial effusion    Problem 4: Hyperlipidemia  Continue statin    I have reviewed clinical labs tests and reports, radiology tests and reports, as well as old patient medical records, and discussed with the referring physician.

## 2023-02-26 NOTE — H&P ADULT - NSHPPHYSICALEXAM_GEN_ALL_CORE
GENERAL: NAD, lying in bed comfortably  HEAD:  Atraumatic, Normocephalic  EYES: EOMI, PERRLA, conjunctiva and sclera clear  ENT: Moist mucous membranes  NECK: Supple, No JVD  CHEST/LUNG: Clear to auscultation bilaterally; No rales, rhonchi, wheezing, or rubs. Unlabored respirations  HEART: Regular rate and rhythm; No murmurs, rubs, or gallops  ABDOMEN: Bowel sounds present; Soft, Nontender, Nondistended. No hepatomegally  EXTREMITIES:  2+ Peripheral Pulses, brisk capillary refill. No clubbing, cyanosis, or edema  NERVOUS SYSTEM:  Alert & Oriented X3, speech clear. No deficits   MSK: FROM all 4 extremities, full and equal strength  SKIN: No rashes or lesions GENERAL: NAD, lying in bed comfortably  HEAD:  Atraumatic, Normocephalic  EYES: EOMI, PERRLA, conjunctiva and sclera clear  ENT: Moist mucous membranes  NECK: Supple, No JVD  CHEST/LUNG: Clear to auscultation bilaterally; No rales, rhonchi, wheezing, or rubs. Unlabored respirations  HEART: Regular rate and rhythm; No murmurs, rubs, or gallops  ABDOMEN: Bowel sounds present; Soft, Nontender, Nondistended. No hepatomegaly  EXTREMITIES:  2+ Peripheral Pulses, brisk capillary refill. No clubbing, cyanosis, or edema  NERVOUS SYSTEM:  Alert & Oriented X3, speech clear. No deficits   MSK: FROM all 4 extremities, full and equal strength  SKIN: No rashes or lesions

## 2023-02-26 NOTE — H&P ADULT - NSHPSOCIALHISTORY_GEN_ALL_CORE
Uses walker at home, lives at home alone, denies smoking hx, per chart review from [facility], 40 pack year quit >30 yrs ago Uses walker at home, lives at home alone, denies smoking hx, per chart review from [facility], 40 pack-year quit >30 yrs ago

## 2023-02-26 NOTE — CONSULT NOTE ADULT - SUBJECTIVE AND OBJECTIVE BOX
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 Patient developed tachypnea in ED, noted to have BL crackles, bedside echo at [facility] showed large pericardial effusion. Vitals at [facility] during admission /63, HR 85, O2 95%. EKG showed NSR, LBBB and electrical alternans which was confirmed by telemetry BNP 75, Cr 2.13/BUN 52, Trop T 13.7,  Patient remained HDS at [facility].  Per daughter at bedside, patient last had an episode of pericardial effusion in 2021 in [place] and underwent pericardiocentesis with an unknown etiology. Of note, pt was recently hospitalized on  at [facility] for SOB, BL LE edema, her home lasix 40mg was increased to q12 from qd, limited by Cr/BUN 2.13/ 52. Transferred to [facility] for further management of pericardial effusion.  Structural heart consulted for evaluation of pericardial effusion.      PAST MEDICAL & SURGICAL HISTORY:      MEDICATIONS  (STANDING):  atorvastatin 20 milliGRAM(s) Oral at bedtime  chlorhexidine 2% Cloths 1 Application(s) Topical <User Schedule>  influenza  Vaccine (HIGH DOSE) 0.7 milliLiter(s) IntraMuscular once  NIFEdipine XL 60 milliGRAM(s) Oral every 24 hours  pantoprazole    Tablet 40 milliGRAM(s) Oral before breakfast    MEDICATIONS  (PRN):      Allergies    Allergy Status Unknown    Intolerances        SOCIAL HISTORY:  Smoker:  Former smoker 30 pack year history  ETOH use:  NO              Ilicit Drug use:  NO      FAMILY HISTORY:      Review of Systems (Need 10):  CONSTITUTIONAL: Denies fevers / chills, sweats, fatigue, weight loss, weight gain                                       NEURO:  Denies parathesias, seizures, syncope, confusion                                                                                  EYES:  Denies blurry vision, discharge, pain, loss of vision                                                                                    ENMT:  Denies difficulty hearing, vertigo, dysphagia, epistaxis, recent dental work                                       CV:  Denies chest pain, palpitations, [place], orthopnea                                                                                           RESPIRATORY:  Denies wWheezing, SOB, cough / sputum, hemoptysis                                                               GI:  Denies nausea, vomiting, diarrhea, constipation, melena                                                                          : Denies hematuria, dysuria, urgency, incontinence                                                                                          MUSKULOSKELETAL:  Denies arthritis, joint swelling, muscle weakness                                                             SKIN/BREAST:  Denies rash, itching, hair loss, masses                                                                                              PSYCH:  Denies depression, anxiety, suicidal ideation                                                                                                HEME/LYMPH:  Denies bruises easily, enlarged lymph nodes, tender lymph nodes                                          ENDOCRINE:  Denies cold intolerance, heat intolerance, polydipsia                                                                      Vital Signs Last 24 Hrs  T(C): 37 (2023 09:50), Max: 37 (2023 09:50)  T(F): 98.6 (2023 09:50), Max: 98.6 (2023 09:50)  HR: 83 (2023 12:00) (60 - 83)  BP: 147/89 (2023 12:00) (123/61 - 161/71)  BP(mean): 107 (2023 12:00) (88 - 107)  RR: 22 (2023 12:00) (17 - 29)  SpO2: 98% (2023 12:00) (98% - 100%)    Parameters below as of 2023 12:00  Patient On (Oxygen Delivery Method): nasal cannula  O2 Flow (L/min): 2      Physical Exam (Need 8)  CONSTITUTIONAL:                                                                          WNL  NEURO:                                                                                             WNL                      EYES:                                                                                                  WNL  ENMT:                                                                                                WNL  CV:                                                                                                      WNL  RESPIRATORY:                                                                                  WNL  GI:                                                                                                       WNL  : [place] + / -                                                                                 WNL  MUSKULOSKELETAL:                                                                       WNL  SKIN / BREAST:                                                                                 WNL                                                          LABS:                        8.8    5.60  )-----------( 140      ( 2023 10:16 )             27.4     02-    143  |  107  |  39<H>  ----------------------------<  122<H>  4.0   |  25  |  1.71<H>    Ca    9.4      2023 10:16  Phos  3.3     02-  Mg     1.8         TPro  7.0  /  Alb  3.5  /  TBili  0.2  /  DBili  x   /  AST  16  /  ALT  10  /  AlkPhos  56      PT/INR - ( 2023 03:35 )   PT: 12.3 sec;   INR: 1.03          PTT - ( 2023 03:35 )  PTT:30.6 sec  Urinalysis Basic - ( 2023 07:55 )    Color: Yellow / Appearance: Clear / S.020 / pH: x  Gluc: x / Ketone: NEGATIVE  / Bili: Negative / Urobili: 0.2 E.U./dL   Blood: x / Protein: Trace mg/dL / Nitrite: NEGATIVE   Leuk Esterase: Moderate / RBC: < 5 /HPF / WBC > 10 /HPF   Sq Epi: x / Non Sq Epi: 5-10 /HPF / Bacteria: Present /HPF      CARDIAC MARKERS ( 2023 03:35 )  x     / 0.03 ng/mL / 73 U/L / x     / 1.6 ng/mL          RADIOLOGY & ADDITIONAL STUDIES:  CAROTID U/S:    CXR:    CT Scan:    EKG:    TTE / DALJIT:    Cardiac Cath:

## 2023-02-26 NOTE — CONSULT NOTE ADULT - NS ATTEND AMEND GEN_ALL_CORE FT
Agree with above with the following comments.    Pt seen and evaluated at bedside. Daughter present in room. Pt remains hemodynamically stable. Bedside echo showing larger effusion than echo at outside institution. Now with evidence of tamponade on echo. Pt complaining of discomfort in chest. Advised pt about pericardial drainage. She agrees to the procedure. Will perform in the cath lab. Sample of fluid to be sent for analysis. Drain will be left in to monitor for further fluid.

## 2023-02-26 NOTE — PATIENT PROFILE ADULT - FALL HARM RISK - FACTORS
IV and/or equipment tethered to patient/Medication side effects/Other medical problems/Weakness/Other

## 2023-02-26 NOTE — PROGRESS NOTE ADULT - ASSESSMENT
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      NEURO  JOSE J      CV  #Pericardial effusion  Pt transferred to [facility] with incidental pericardial effusion found at [facility]. Per daughter at bedside, pt underwent pericardiocentesis in 07/2021 in [place]. On home lasix 40mg BID. Unknown etiology. Pt appears euvolemic.  Bedside echo 2/26: IVC collapsable, RA early diastolic collapse, early cardiac tamponade physiology.    Plan:  - Plan for Pericardial Drain (Interventional cardiology consulted for procedure)  - CT surgery initially talked to for potential placement of a pericardial window, however would like to await for completion of echo for further recs.   - Continue to hold lasix       #CHF  Pt with hx of CHF, unknown last EF. On home carvedilol 12.5 mg BID, enalapril 10mg. BNP and trop WNL at [facility]. Not in current exacerbation. Extremities WWP.    Plan:  - continue to hold home carvedilol and enalapril      #PVD  Hx of LE PVD. On [id number] and Atorvastatin 20mg. Per pt, no hx of stents. No current symptoms.     Plan:  -c/w Atorvastatin 20mg qd      #HTN  Pt with hx of HTN, on home nifedipine 60mg daily  Plan:  -c/w nifedipine 60mg daily    #Elevated troponin  pt with trop 0.03, denies CP, no ischemic findings on EKG. CK, CKMB WNL.  Plan:  -monitor daily EKG    PULM  #CATE  Pt with hx of CATE, on home O2, around 4L  Plan:  -satting 99% on NC 2L,   -c/w RA      GI  #GERD  Pt with hx of GERD, on home pantoprazole 40mg daily  Plan:  -c.w pantoprazole 40mg daily      RENAL  #CKD stage III  Pt with Cr 1.8 on admission, increasing on admission   Plan:  -trend BUN/Cr daily    ENDO  #Dm  Admitted to [facility] for hypoglycemia to reported 49mg/dl, 39mg/dL, then 29 mg/dL on home finger stick. On home glipizide 10mg qd, Sitagliptin 50mg.  Plan:  -started on sliding scale lispro  -monitor FSG q6    HEME/ONC  #Anemia  Hgb 9.6, likely from CKD. No signs of active bleeding. Denies melana, hamturia, hemoptysis.  Plan:  -f/u iron panel  -maintain active T&S  -transfuse hgb<7    ID  JOSE J    F:As needed  E:replete prn  N:NPO after MN  A:none

## 2023-02-26 NOTE — PATIENT PROFILE ADULT - FALL HARM RISK - HARM RISK INTERVENTIONS
Assistance with ambulation/Assistance OOB with selected safe patient handling equipment/Communicate Risk of Fall with Harm to all staff/Discuss with provider need for PT consult/Monitor gait and stability/Provide patient with walking aids - walker, cane, crutches/Reinforce activity limits and safety measures with patient and family/Review medications for side effects contributing to fall risk/Sit up slowly, dangle for a short time, stand at bedside before walking/Tailored Fall Risk Interventions/Toileting schedule using arm’s reach rule for commode and bathroom/Visual Cue: Yellow wristband and red socks/Bed in lowest position, wheels locked, appropriate side rails in place/Call bell, personal items and telephone in reach/Instruct patient to call for assistance before getting out of bed or chair/Non-slip footwear when patient is out of bed/[place] to call system/Physically safe environment - no spills, clutter or unnecessary equipment/Purposeful Proactive Rounding/Room/bathroom lighting operational, light cord in reach

## 2023-02-27 LAB
ALBUMIN FLD-MCNC: 2.9 G/DL — SIGNIFICANT CHANGE UP
ALBUMIN SERPL ELPH-MCNC: 3.2 G/DL — LOW (ref 3.3–5)
ALP SERPL-CCNC: 55 U/L — SIGNIFICANT CHANGE UP (ref 40–120)
ALT FLD-CCNC: 9 U/L — LOW (ref 10–45)
ANION GAP SERPL CALC-SCNC: 9 MMOL/L — SIGNIFICANT CHANGE UP (ref 5–17)
APPEARANCE UR: CLEAR — SIGNIFICANT CHANGE UP
APTT BLD: 27.4 SEC — LOW (ref 27.5–35.5)
AST SERPL-CCNC: 14 U/L — SIGNIFICANT CHANGE UP (ref 10–40)
B PERT IGG+IGM PNL SER: CLEAR — SIGNIFICANT CHANGE UP
BACTERIA # UR AUTO: PRESENT /HPF
BASOPHILS # BLD AUTO: 0.02 K/UL — SIGNIFICANT CHANGE UP (ref 0–0.2)
BASOPHILS NFR BLD AUTO: 0.2 % — SIGNIFICANT CHANGE UP (ref 0–2)
BILIRUB SERPL-MCNC: 0.3 MG/DL — SIGNIFICANT CHANGE UP (ref 0.2–1.2)
BILIRUB UR-MCNC: NEGATIVE — SIGNIFICANT CHANGE UP
BUN SERPL-MCNC: 40 MG/DL — HIGH (ref 7–23)
CALCIUM SERPL-MCNC: 9.3 MG/DL — SIGNIFICANT CHANGE UP (ref 8.4–10.5)
CHLORIDE SERPL-SCNC: 109 MMOL/L — HIGH (ref 96–108)
CO2 SERPL-SCNC: 26 MMOL/L — SIGNIFICANT CHANGE UP (ref 22–31)
COLOR FLD: YELLOW — SIGNIFICANT CHANGE UP
COLOR SPEC: YELLOW — SIGNIFICANT CHANGE UP
COMMENT - FLUIDS: SIGNIFICANT CHANGE UP
CREAT SERPL-MCNC: 1.77 MG/DL — HIGH (ref 0.5–1.3)
DIFF PNL FLD: NEGATIVE — SIGNIFICANT CHANGE UP
EGFR: 28 ML/MIN/1.73M2 — LOW
EOSINOPHIL # BLD AUTO: 0.03 K/UL — SIGNIFICANT CHANGE UP (ref 0–0.5)
EOSINOPHIL NFR BLD AUTO: 0.3 % — SIGNIFICANT CHANGE UP (ref 0–6)
EPI CELLS # UR: SIGNIFICANT CHANGE UP /HPF (ref 0–5)
FLUID INTAKE SUBSTANCE CLASS: SIGNIFICANT CHANGE UP
GLUCOSE BLDC GLUCOMTR-MCNC: 163 MG/DL — HIGH (ref 70–99)
GLUCOSE BLDC GLUCOMTR-MCNC: 170 MG/DL — HIGH (ref 70–99)
GLUCOSE BLDC GLUCOMTR-MCNC: 172 MG/DL — HIGH (ref 70–99)
GLUCOSE BLDC GLUCOMTR-MCNC: 187 MG/DL — HIGH (ref 70–99)
GLUCOSE FLD-MCNC: 153 MG/DL — SIGNIFICANT CHANGE UP
GLUCOSE SERPL-MCNC: 169 MG/DL — HIGH (ref 70–99)
GLUCOSE UR QL: 100
GRAM STN FLD: SIGNIFICANT CHANGE UP
HCT VFR BLD CALC: 27.1 % — LOW (ref 34.5–45)
HGB BLD-MCNC: 8.8 G/DL — LOW (ref 11.5–15.5)
IMM GRANULOCYTES NFR BLD AUTO: 0.5 % — SIGNIFICANT CHANGE UP (ref 0–0.9)
INR BLD: 1.07 — SIGNIFICANT CHANGE UP (ref 0.88–1.16)
KETONES UR-MCNC: NEGATIVE — SIGNIFICANT CHANGE UP
LDH SERPL L TO P-CCNC: 109 U/L — SIGNIFICANT CHANGE UP
LEUKOCYTE ESTERASE UR-ACNC: ABNORMAL
LYMPHOCYTES # BLD AUTO: 2.14 K/UL — SIGNIFICANT CHANGE UP (ref 1–3.3)
LYMPHOCYTES # BLD AUTO: 20.7 % — SIGNIFICANT CHANGE UP (ref 13–44)
LYMPHOCYTES # FLD: 43 % — SIGNIFICANT CHANGE UP
MAGNESIUM SERPL-MCNC: 1.9 MG/DL — SIGNIFICANT CHANGE UP (ref 1.6–2.6)
MCHC RBC-ENTMCNC: 30.6 PG — SIGNIFICANT CHANGE UP (ref 27–34)
MCHC RBC-ENTMCNC: 32.5 GM/DL — SIGNIFICANT CHANGE UP (ref 32–36)
MCV RBC AUTO: 94.1 FL — SIGNIFICANT CHANGE UP (ref 80–100)
MONOCYTES # BLD AUTO: 1.02 K/UL — HIGH (ref 0–0.9)
MONOCYTES NFR BLD AUTO: 9.9 % — SIGNIFICANT CHANGE UP (ref 2–14)
MONOS+MACROS # FLD: 56 % — SIGNIFICANT CHANGE UP
NEUTROPHILS # BLD AUTO: 7.08 K/UL — SIGNIFICANT CHANGE UP (ref 1.8–7.4)
NEUTROPHILS NFR BLD AUTO: 68.4 % — SIGNIFICANT CHANGE UP (ref 43–77)
NEUTROPHILS-BODY FLUID: 1 % — SIGNIFICANT CHANGE UP
NITRITE UR-MCNC: NEGATIVE — SIGNIFICANT CHANGE UP
NRBC # BLD: 0 /100 WBCS — SIGNIFICANT CHANGE UP (ref 0–0)
PH UR: 6 — SIGNIFICANT CHANGE UP (ref 5–8)
PHOSPHATE SERPL-MCNC: 2.8 MG/DL — SIGNIFICANT CHANGE UP (ref 2.5–4.5)
PLATELET # BLD AUTO: 154 K/UL — SIGNIFICANT CHANGE UP (ref 150–400)
POTASSIUM SERPL-MCNC: 4.1 MMOL/L — SIGNIFICANT CHANGE UP (ref 3.5–5.3)
POTASSIUM SERPL-SCNC: 4.1 MMOL/L — SIGNIFICANT CHANGE UP (ref 3.5–5.3)
PROCALCITONIN SERPL-MCNC: 1.03 NG/ML — HIGH (ref 0.02–0.1)
PROT FLD-MCNC: 4.8 G/DL — SIGNIFICANT CHANGE UP
PROT SERPL-MCNC: 6.7 G/DL — SIGNIFICANT CHANGE UP (ref 6–8.3)
PROT UR-MCNC: NEGATIVE MG/DL — SIGNIFICANT CHANGE UP
PROTHROM AB SERPL-ACNC: 12.8 SEC — SIGNIFICANT CHANGE UP (ref 10.5–13.4)
RBC # BLD: 2.88 M/UL — LOW (ref 3.8–5.2)
RBC # FLD: 16.5 % — HIGH (ref 10.3–14.5)
RBC CASTS # UR COMP ASSIST: < 5 /HPF — SIGNIFICANT CHANGE UP
RCV VOL RI: < 2000 /UL — SIGNIFICANT CHANGE UP (ref 0–0)
RHEUMATOID FACT SERPL-ACNC: <10 IU/ML — SIGNIFICANT CHANGE UP (ref 0–13)
SODIUM SERPL-SCNC: 144 MMOL/L — SIGNIFICANT CHANGE UP (ref 135–145)
SP GR SPEC: 1.02 — SIGNIFICANT CHANGE UP (ref 1–1.03)
SPECIMEN SOURCE FLD: SIGNIFICANT CHANGE UP
SPECIMEN SOURCE: SIGNIFICANT CHANGE UP
T3 SERPL-MCNC: 102 NG/DL — SIGNIFICANT CHANGE UP (ref 80–200)
TOTAL NUCLEATED CELL COUNT, BODY FLUID: 261 /UL — SIGNIFICANT CHANGE UP
TUBE TYPE: SIGNIFICANT CHANGE UP
UROBILINOGEN FLD QL: 0.2 E.U./DL — SIGNIFICANT CHANGE UP
WBC # BLD: 10.34 K/UL — SIGNIFICANT CHANGE UP (ref 3.8–10.5)
WBC # FLD AUTO: 10.34 K/UL — SIGNIFICANT CHANGE UP (ref 3.8–10.5)
WBC UR QL: < 5 /HPF — SIGNIFICANT CHANGE UP

## 2023-02-27 PROCEDURE — 88305 TISSUE EXAM BY PATHOLOGIST: CPT | Mod: 26

## 2023-02-27 PROCEDURE — 99223 1ST HOSP IP/OBS HIGH 75: CPT

## 2023-02-27 PROCEDURE — 99232 SBSQ HOSP IP/OBS MODERATE 35: CPT

## 2023-02-27 PROCEDURE — 71045 X-RAY EXAM CHEST 1 VIEW: CPT | Mod: 26

## 2023-02-27 PROCEDURE — 88112 CYTOPATH CELL ENHANCE TECH: CPT | Mod: 26

## 2023-02-27 PROCEDURE — 93321 DOPPLER ECHO F-UP/LMTD STD: CPT | Mod: 26

## 2023-02-27 PROCEDURE — 99291 CRITICAL CARE FIRST HOUR: CPT

## 2023-02-27 PROCEDURE — 93308 TTE F-UP OR LMTD: CPT | Mod: 26

## 2023-02-27 RX ORDER — NIFEDIPINE 30 MG
60 TABLET, EXTENDED RELEASE 24 HR ORAL EVERY 12 HOURS
Refills: 0 | Status: DISCONTINUED | OUTPATIENT
Start: 2023-02-27 | End: 2023-03-07

## 2023-02-27 RX ORDER — ENOXAPARIN SODIUM 100 MG/ML
30 INJECTION SUBCUTANEOUS EVERY 24 HOURS
Refills: 0 | Status: DISCONTINUED | OUTPATIENT
Start: 2023-02-27 | End: 2023-03-07

## 2023-02-27 RX ORDER — NIFEDIPINE 30 MG
60 TABLET, EXTENDED RELEASE 24 HR ORAL EVERY 12 HOURS
Refills: 0 | Status: DISCONTINUED | OUTPATIENT
Start: 2023-02-27 | End: 2023-02-27

## 2023-02-27 RX ORDER — ACETAMINOPHEN 500 MG
650 TABLET ORAL EVERY 6 HOURS
Refills: 0 | Status: DISCONTINUED | OUTPATIENT
Start: 2023-02-27 | End: 2023-03-07

## 2023-02-27 RX ADMIN — Medication 60 MILLIGRAM(S): at 19:05

## 2023-02-27 RX ADMIN — ATORVASTATIN CALCIUM 20 MILLIGRAM(S): 80 TABLET, FILM COATED ORAL at 21:19

## 2023-02-27 RX ADMIN — Medication 2: at 11:15

## 2023-02-27 RX ADMIN — Medication 650 MILLIGRAM(S): at 12:21

## 2023-02-27 RX ADMIN — Medication 2: at 16:01

## 2023-02-27 RX ADMIN — Medication 650 MILLIGRAM(S): at 02:52

## 2023-02-27 RX ADMIN — PANTOPRAZOLE SODIUM 40 MILLIGRAM(S): 20 TABLET, DELAYED RELEASE ORAL at 07:01

## 2023-02-27 RX ADMIN — ENOXAPARIN SODIUM 30 MILLIGRAM(S): 100 INJECTION SUBCUTANEOUS at 11:15

## 2023-02-27 RX ADMIN — CHLORHEXIDINE GLUCONATE 1 APPLICATION(S): 213 SOLUTION TOPICAL at 07:02

## 2023-02-27 RX ADMIN — Medication 2: at 21:19

## 2023-02-27 RX ADMIN — Medication 650 MILLIGRAM(S): at 19:32

## 2023-02-27 RX ADMIN — Medication 650 MILLIGRAM(S): at 13:15

## 2023-02-27 RX ADMIN — Medication 2: at 07:01

## 2023-02-27 RX ADMIN — Medication 60 MILLIGRAM(S): at 07:01

## 2023-02-27 RX ADMIN — Medication 650 MILLIGRAM(S): at 01:52

## 2023-02-27 RX ADMIN — Medication 650 MILLIGRAM(S): at 20:32

## 2023-02-27 NOTE — PROGRESS NOTE ADULT - ASSESSMENT
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 Admitted to [facility] for hypoglycemia. Patient developed tachypnea in ED, noted to have crackles on exam, bedside echo at [facility] showed large pericardial effusion. Per daughter at bedside, patient last had an episode of pericardial effusion in 07/2021 in [place] and underwent pericardiocentesis with an unknown etiology. Of note, pt was recently hospitalized on 2/13 at [facility] for SOB, LE edema, her home lasix 40mg was increased. Transferred to [facility] for further management of pericardial effusion.  Structural heart consulted for evaluation of pericardial effusion and deemed a good candidate. On 2/26/23 pt underwent pericardiocentesis with Dr. Lee.    Plan:   Problem 1: recurrent pericardial effusion  -s/p pericardiocentesis with Dr. Lee on 2/27/23   -drain remains in place, put out 90cc overnight and 60cc this morning of serous fluid. continue to document accurate in/out   -structural heart will continue to follow patient.    Care plan discussed with Dr. Faust and primary team. Structural heart continuing to follow.    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 Admitted to [facility] for hypoglycemia. Patient developed tachypnea in ED, noted to have crackles on exam, bedside echo at [facility] showed large pericardial effusion. Per daughter at bedside, patient last had an episode of pericardial effusion in 07/2021 in [place] and underwent pericardiocentesis with an unknown etiology. Of note, pt was recently hospitalized on 2/13 at [facility] for SOB, LE edema, her home lasix 40mg was increased. Transferred to [facility] for further management of pericardial effusion.  Structural heart consulted for evaluation of pericardial effusion and deemed a good candidate. On 2/26/23 pt underwent pericardiocentesis with Dr. Lee.    Plan:   Problem 1: Pericardial effusion with tamponade  -s/p pericardiocentesis with Dr. Lee on 2/27/23   -drain remains in place, put out 90cc overnight and 60cc this morning of serous fluid. continue to document accurate in/out   -structural heart will continue to follow patient.    Care plan discussed with Dr. Faust and primary team. Structural heart continuing to follow.

## 2023-02-27 NOTE — PROGRESS NOTE ADULT - SUBJECTIVE AND OBJECTIVE BOX
*incomplete* TU ADKINS 83y Female  MRN#: [id number]     SUBJECTIVE  Today is post (pericardial drain) day 1d, and this morning she does not complain of tenderness or pain at the pericardial pain site as well as on her entire back. She denies any CP, SOB, lightheadedness or nausea, vomiting, diarrhea.     OBJECTIVE  PAST MEDICAL & SURGICAL HISTORY    ALLERGIES:  Allergy Status Unknown    MEDICATIONS:  STANDING MEDICATIONS  atorvastatin 20 milliGRAM(s) Oral at bedtime  chlorhexidine 2% Cloths 1 Application(s) Topical <User Schedule>  dextrose 5%. 1000 milliLiter(s) IV Continuous <Continuous>  dextrose 5%. 1000 milliLiter(s) IV Continuous <Continuous>  dextrose 50% Injectable 25 Gram(s) IV Push once  dextrose 50% Injectable 12.5 Gram(s) IV Push once  dextrose 50% Injectable 25 Gram(s) IV Push once  enoxaparin Injectable 30 milliGRAM(s) SubCutaneous every 24 hours  glucagon  Injectable 1 milliGRAM(s) IntraMuscular once  influenza  Vaccine (HIGH DOSE) 0.7 milliLiter(s) IntraMuscular once  insulin lispro (ADMELOG) corrective regimen sliding scale   SubCutaneous three times a day before meals  NIFEdipine XL 60 milliGRAM(s) Oral every 24 hours  pantoprazole    Tablet 40 milliGRAM(s) Oral before breakfast    PRN MEDICATIONS  acetaminophen     Tablet .. 650 milliGRAM(s) Oral every 6 hours PRN  dextrose Oral Gel 15 Gram(s) Oral once PRN      VITAL SIGNS: Last 24 Hours  T(C): 37.4 (2023 09:00), Max: 38.2 (2023 01:23)  T(F): 99.3 (2023 09:00), Max: 100.8 (2023 01:23)  HR: 84 (2023 08:23) (65 - 94)  BP: 151/67 (2023 08:00) (114/63 - 163/70)  BP(mean): 97 (2023 08:00) (82 - 107)  RR: 28 (2023 08:23) (15 - 31)  SpO2: 100% (2023 08:23) (98% - 100%)    LABS:                        8.8    10.34 )-----------( 154      ( 2023 05:30 )             27.1         144  |  109<H>  |  40<H>  ----------------------------<  169<H>  4.1   |  26  |  1.77<H>    Ca    9.3      2023 05:30  Phos  2.8       Mg     1.9         TPro  6.7  /  Alb  3.2<L>  /  TBili  0.3  /  DBili  x   /  AST  14  /  ALT  9<L>  /  AlkPhos  55      PT/INR - ( 2023 05:30 )   PT: 12.8 sec;   INR: 1.07          PTT - ( 2023 05:30 )  PTT:27.4 sec  Urinalysis Basic - ( 2023 01:17 )    Color: Yellow / Appearance: Clear / S.020 / pH: x  Gluc: x / Ketone: NEGATIVE  / Bili: Negative / Urobili: 0.2 E.U./dL   Blood: x / Protein: NEGATIVE mg/dL / Nitrite: NEGATIVE   Leuk Esterase: Trace / RBC: < 5 /HPF / WBC < 5 /HPF   Sq Epi: x / Non Sq Epi: 0-5 /HPF / Bacteria: Present /HPF      Lactate, Blood: 0.5 mmol/L (23 @ 10:16)      CARDIAC MARKERS ( 2023 03:35 )  x     / 0.03 ng/mL / 73 U/L / x     / 1.6 ng/mL      PHYSICAL EXAM:  General: NAD, AAOx3, resting comfortably.   HEENT: atraumatic, normocephalic  Pulmonary: clear to auscultation bilaterally; No wheeze  Cardiovascular: Muffled heart sounds, +quiet friction rub?, +tachy, regular rhythm no M/G Normal S1S2 + Pericardial drain in place, w/o tenderness or leakage w pale yellow drainage in bag   Gastrointestinal: Soft, nontender, nondistended; bowel sounds present  Musculoskeletal: 2+ peripheral pulses, no clubbing, cyanosis or edema  Neurology: Pt. alert and oriented, fluent speech, able to move all extremities  Skin: + L hand blisters noted  TU ADKINS 83y Female  MRN#: [id number]     SUBJECTIVE  Today is post (pericardial drain) day 1d, and this morning she does not complain of tenderness or pain at the pericardial pain site as well as on her entire back. She denies any CP, SOB, lightheadedness or nausea, vomiting, diarrhea.     OBJECTIVE  PAST MEDICAL & SURGICAL HISTORY    ALLERGIES:  Allergy Status Unknown    MEDICATIONS:  STANDING MEDICATIONS  atorvastatin 20 milliGRAM(s) Oral at bedtime  chlorhexidine 2% Cloths 1 Application(s) Topical <User Schedule>  dextrose 5%. 1000 milliLiter(s) IV Continuous <Continuous>  dextrose 5%. 1000 milliLiter(s) IV Continuous <Continuous>  dextrose 50% Injectable 25 Gram(s) IV Push once  dextrose 50% Injectable 12.5 Gram(s) IV Push once  dextrose 50% Injectable 25 Gram(s) IV Push once  enoxaparin Injectable 30 milliGRAM(s) SubCutaneous every 24 hours  glucagon  Injectable 1 milliGRAM(s) IntraMuscular once  influenza  Vaccine (HIGH DOSE) 0.7 milliLiter(s) IntraMuscular once  insulin lispro (ADMELOG) corrective regimen sliding scale   SubCutaneous three times a day before meals  NIFEdipine XL 60 milliGRAM(s) Oral every 24 hours  pantoprazole    Tablet 40 milliGRAM(s) Oral before breakfast    PRN MEDICATIONS  acetaminophen     Tablet .. 650 milliGRAM(s) Oral every 6 hours PRN  dextrose Oral Gel 15 Gram(s) Oral once PRN      VITAL SIGNS: Last 24 Hours  T(C): 37.4 (2023 09:00), Max: 38.2 (2023 01:23)  T(F): 99.3 (2023 09:00), Max: 100.8 (2023 01:23)  HR: 84 (2023 08:23) (65 - 94)  BP: 151/67 (2023 08:00) (114/63 - 163/70)  BP(mean): 97 (2023 08:00) (82 - 107)  RR: 28 (2023 08:23) (15 - 31)  SpO2: 100% (2023 08:23) (98% - 100%)    LABS:                        8.8    10.34 )-----------( 154      ( 2023 05:30 )             27.1         144  |  109<H>  |  40<H>  ----------------------------<  169<H>  4.1   |  26  |  1.77<H>    Ca    9.3      2023 05:30  Phos  2.8       Mg     1.9         TPro  6.7  /  Alb  3.2<L>  /  TBili  0.3  /  DBili  x   /  AST  14  /  ALT  9<L>  /  AlkPhos  55      PT/INR - ( 2023 05:30 )   PT: 12.8 sec;   INR: 1.07          PTT - ( 2023 05:30 )  PTT:27.4 sec  Urinalysis Basic - ( 2023 01:17 )    Color: Yellow / Appearance: Clear / S.020 / pH: x  Gluc: x / Ketone: NEGATIVE  / Bili: Negative / Urobili: 0.2 E.U./dL   Blood: x / Protein: NEGATIVE mg/dL / Nitrite: NEGATIVE   Leuk Esterase: Trace / RBC: < 5 /HPF / WBC < 5 /HPF   Sq Epi: x / Non Sq Epi: 0-5 /HPF / Bacteria: Present /HPF      Lactate, Blood: 0.5 mmol/L (23 @ 10:16)      CARDIAC MARKERS ( 2023 03:35 )  x     / 0.03 ng/mL / 73 U/L / x     / 1.6 ng/mL      PHYSICAL EXAM:  General: NAD, AAOx3, resting comfortably.   HEENT: atraumatic, normocephalic  Pulmonary: clear to auscultation bilaterally; No wheeze  Cardiovascular: Muffled heart sounds, +quiet friction rub?, +tachy, regular rhythm no M/G Normal S1S2 + Pericardial drain in place, w/o tenderness or leakage w pale yellow drainage in bag   Gastrointestinal: Soft, nontender, nondistended; bowel sounds present  Musculoskeletal: 2+ peripheral pulses, no clubbing, cyanosis or edema  Neurology: Pt. alert and oriented, fluent speech, able to move all extremities  Skin: + L hand blisters noted, stable from prior

## 2023-02-27 NOTE — PROGRESS NOTE ADULT - ASSESSMENT
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 for hypoglycemia, found to have pericardial effusion s/p pericardial drain.     NEURO  JOSE J    CV  #Pericardial effusion  Pt transferred to [facility] with incidental pericardial effusion found at [facility]. Per daughter at bedside, pt underwent pericardiocentesis in 07/2021 in [place]. On home lasix 40mg BID. Bedside echo 2/26: IVC collapsable, RA early diastolic collapse, early cardiac tamponade physiology.    Plan:  - S/p pericardial drain w 80cc drainage/24hr period   - Repeat Echo planned 2/27   - f/u pericardial fluid analysis   - Consider 6mo colchicine due to recurrent pericardial effusion  - Continue to hold lasix, UOP net negative remains euvolemic on exam       #CHF  Pt with hx of CHF, unknown last EF. On home carvedilol 12.5 mg BID, enalapril 10mg. BNP and trop WNL at [place]. Not in current exacerbation. Extremities WWP.    Plan:  - Patient slightly hypertensive w/o sx of CP, lightheadedness, or SOB  restart home carvedilol and enalapril      #PVD  Hx of LE PVD. On [id number] and Atorvastatin 20mg. Per pt, no hx of stents. No current symptoms w elevated cholesterol and LDL, decreased HDL     Plan:  -c/w Atorvastatin 20mg qd      #HTN  Pt with hx of HTN, on home nifedipine 60mg daily  Plan:  -c/w nifedipine 60mg daily    #Elevated troponin  pt with trop 0.03, denies CP, no ischemic findings on EKG. CK, CKMB WNL.  Plan:  -monitor daily EKG    PULM  #CATE  Pt with hx of CATE, on home O2, around 4L  Plan:  -satting 99% on NC 2L  - CPAP at night     GI  #GERD  Pt with hx of GERD, on home pantoprazole 40mg daily  Plan:  -c.w pantoprazole 40mg daily      RENAL  #CKD stage III  Pt with Cr 1.8 on admission, increasing on admission   Plan:  -trend BUN/Cr daily    ENDO  #Dm  Admitted to [place] for hypoglycemia to reported 49mg/dl, 39mg/dL, then 29 mg/dL on home finger stick. On home glipizide 10mg qd, Sitagliptin 50mg.  Plan:  -started on sliding scale lispro  -monitor FSG q6    HEME/ONC  #Anemia  Hgb 9.6, likely from CKD. No signs of active bleeding. Denies melana, hamturia, hemoptysis.  Plan:  -f/u iron panel  -maintain active T&S  -transfuse hgb<7    ID  Fever 2/26 overnight w Tmax 100.8, non-leukocytotic w elevated procal 1.03 w/o CP, subjective fever, SOB. 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     NEURO  JOSE J    CV  #Pericardial effusion  Pt transferred to [facility] with incidental pericardial effusion found at [facility]. Per daughter at bedside, pt underwent pericardiocentesis in 07/2021 in [place]. On home lasix 40mg BID. Bedside echo 2/26: IVC collapsable, RA early diastolic collapse, early cardiac tamponade physiology.  Plan:  - S/p pericardial drain w 175cc drainage/24hr period   - f/u Echo 2/27   - f/u pericardial fluid analysis   - Continue to hold lasix, UOP net negative    #CHF  Pt with hx of CHF, unknown last EF. On home carvedilol 12.5 mg BID, enalapril 10mg. BNP and trop WNL at [place]. Not in current exacerbation. Extremities WWP.  Plan:  - Patient mildly hypertensive w/o sx of CP, lightheadedness, or SOB, however continue to hold home carvedilol and enalapril  - f/u recs for ischemic workup for prior CAD   - EP consulted f/u recs     #PVD  Hx of LE PVD. On [id number] and Atorvastatin 20mg. Per pt, no hx of stents. No current symptoms w elevated cholesterol and LDL, decreased HDL   Plan:  -c/w Atorvastatin 20mg qd    #HTN  Pt with hx of HTN, on home nifedipine 60mg daily  Plan:  -Increased nifedipine from 60 OD to 60 BID      #Elevated troponin  pt with trop 0.03, denies CP, no ischemic findings on EKG. CK, CKMB WNL.  Plan:  -monitor daily EKG    PULM  #CATE  Pt with hx of CATE, on home O2, around 4L  Plan:  - satting 99% on NC 2L  - BIPAP at night  - Incentive spirometer      GI  #GERD  Pt with hx of GERD, on home pantoprazole 40mg daily  Plan:  -c.w pantoprazole 40mg daily      RENAL  #CKD stage III  Pt with Cr 1.8 on admission, increasing on admission   Plan:  -trend BUN/Cr daily    ENDO  #Dm  Admitted to [place] for hypoglycemia to reported 49mg/dl, 39mg/dL, then 29 mg/dL on home finger stick. On home glipizide 10mg qd, Sitagliptin 50mg.  Plan:  -started on sliding scale lispro  -monitor FSG q6    HEME/ONC  #Anemia  Hgb 9.6, likely from CKD. No signs of active bleeding. Denies melana, hamturia, hemoptysis.  Plan:  -f/u iron panel  -maintain active T&S  -transfuse hgb<7    ID  Fever 2/26 overnight w Tmax 100.8, non-leukocytotic w elevated procal 1.03 w/o CP, subjective fever, SOB. 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     NEURO  JOSE J    CV  #Pericardial effusion  Pt transferred to [facility] with incidental pericardial effusion found at [facility]. Per daughter at bedside, pt underwent pericardiocentesis in 07/2021 in [place]. On home lasix 40mg BID. Bedside echo 2/26: IVC collapsable, RA early diastolic collapse, early cardiac tamponade physiology.  Plan:  - S/p pericardial drain w 175cc drainage/24hr period   - f/u Echo 2/27   - f/u pericardial fluid analysis   - Continue to hold lasix, UOP net negative    #CHF  Pt with hx of CHF, unknown last EF. On home carvedilol 12.5 mg BID, enalapril 10mg. BNP and trop WNL at [place]. Not in current exacerbation. Extremities WWP.  Plan:  - Patient mildly hypertensive w/o sx of CP, lightheadedness, or SOB, however continue to hold home carvedilol and enalapril  - f/u recs for ischemic workup for  - EP consulted     #PVD  Hx of LE PVD. On [id number] and Atorvastatin 20mg. Per pt, no hx of stents. No current symptoms w elevated cholesterol and LDL, decreased HDL   Plan:  -c/w Atorvastatin 20mg qd    #HTN  Pt with hx of HTN, on home nifedipine 60mg daily  Plan:  -Increased nifedipine from 60 OD to 60 BID      #Elevated troponin  pt with trop 0.03, denies CP, no ischemic findings on EKG. CK, CKMB WNL.  Plan:  -monitor daily EKG    PULM  #CATE  Pt with hx of CATE, on home O2, around 4L  Plan:  - satting 99% on NC 2L  - BIPAP at night  - Incentive spirometer      GI  #GERD  Pt with hx of GERD, on home pantoprazole 40mg daily  Plan:  -c.w pantoprazole 40mg daily      RENAL  #CKD stage III w hypokalemia (3.3 --> 2.7 s/p repletion)   Pt with Cr 1.8 on admission, increasing on admission   Plan:  - trend BUN/Cr daily  - f/u repeat labs     ENDO  #Dm  Admitted to [place] for hypoglycemia to reported 49mg/dl, 39mg/dL, then 29 mg/dL on home finger stick. On home glipizide 10mg qd, Sitagliptin 50mg.  Plan:  -started on sliding scale lispro  -monitor FSG q6    HEME/ONC  #Anemia  Hgb 9.6, likely from CKD. No signs of active bleeding. Denies melana, hamturia, hemoptysis.  Plan:  -f/u iron panel  -maintain active T&S  -transfuse hgb<7    ID  Fever 2/26 overnight w Tmax 100.8, non-leukocytotic w elevated procal 1.03 w/o CP, subjective fever, SOB. UA neg  2/2 reactive fever due to insertion of pericardial drain (2/26) vs pneumonia (awaiting final read 2/27 AM)   - f/u blood cultures   - see how patient does off abx, however if continues to spike will need to start abx     F:As needed  E:replete prn  N: DASH  DVT: Lovenox

## 2023-02-27 NOTE — CONSULT NOTE ADULT - SUBJECTIVE AND OBJECTIVE BOX
HPI:    83 year old female with past history of HTN, DM, PVD, CKD3 with anemia of chronic disease, CATE, documented CHF with normal EF this admission, who initially presented to [place] for weakness and was found to be hypoglycemic (lowest fsg 29) and with a pericardial effusion. s/p pericardiocentesis with drain placement on 2/26.     EP consulted for abnormal EKG.   baseline EKG shows normal sinus rhythm with 1st degree AVB with WA interval ~230ms with intermittent LBBB around 150ms. baseline QRS appears to be ~120ms. unclear if it is rate related, the patient does not have any bradycardia or high degree av block on telemetry.      PAST MEDICAL & SURGICAL HISTORY:  see above      Social History: no smoking, no drugs, no algohol    pertinent home medications:    Inpatient Medications:   acetaminophen     Tablet .. 650 milliGRAM(s) Oral every 6 hours PRN  atorvastatin 20 milliGRAM(s) Oral at bedtime  chlorhexidine 2% Cloths 1 Application(s) Topical  enoxaparin Injectable 30 milliGRAM(s) SubCutaneous every 24 hours  insulin lispro (ADMELOG) corrective regimen sliding scale   SubCutaneous Before meals and at bedtime  NIFEdipine XL 60 milliGRAM(s) Oral every 12 hours  pantoprazole    Tablet 40 milliGRAM(s) Oral before breakfast      Allergies: Allergy Status Unknown      ROS:   CONSTITUTIONAL: No fever, weight loss + fatigue  EYES: Pt denies  RESPIRATORY: No cough, wheezing, chills or hemoptysis; No Shortness of Breath  CARDIOVASCULAR: see HPI  GASTROINTESTINAL: Pt denies  NEUROLOGICAL: Pt denies  SKIN: Pt denies   PSYCHIATRIC: Pt denies  HEME/LYMPH: Pt denies    PHYSICAL:  T(C): 37.4 (02-27-23 @ 09:00), Max: 38.2 (02-27-23 @ 01:23)  HR: 80 (02-27-23 @ 15:00) (65 - 94)  BP: 149/63 (02-27-23 @ 15:00) (114/63 - 163/70)  RR: 24 (02-27-23 @ 15:00) (15 - 32)  SpO2: 96% (02-27-23 @ 15:00) (95% - 100%)  Wt(kg): 77kG  Appearance: No acute distress, well developed  Eyes: normal appearing conjunctiva, pupils and eyelids  Cardiovascular: Normal S1 S2, No JVD, No murmurs, No edema  Respiratory: Lungs clear to auscultation	bilaterally.  No wheeze, rhonchi, rales noted  Gastrointestinal:  Soft, NT/ND 	  Neurologic:  No deficit noted  Psych: A&Ox3, normal mood/affect  Musculoskeletal: normal gait  Skin: no rash noted, normal color and pigmentation.        LABS:                        8.8    10.34 )-----------( 154      ( 27 Feb 2023 05:30 )             27.1     02-27    144  |  109<H>  |  40<H>  ----------------------------<  169<H>  4.1   |  26  |  1.77<H>    Ca    9.3      27 Feb 2023 05:30  Phos  2.8     02-27  Mg     1.9     02-27    TPro  6.7  /  Alb  3.2<L>  /  TBili  0.3  /  DBili  x   /  AST  14  /  ALT  9<L>  /  AlkPhos  55  02-27    PT/INR - ( 27 Feb 2023 05:30 )   PT: 12.8 sec;   INR: 1.07          PTT - ( 27 Feb 2023 05:30 )  PTT:27.4 sec    ECHO:2/26:    1. Limited study obtained for evaluation of pericardial effusion performed by cardiology fellow on call.  2. Left ventricular endocardium is not well visualized. Grossly, left ventricular function appears normal.  3. Normal right ventricular size and systolic function.  4. Large pericardial effusion with echocardiographic evidence of cardiac tamponade physiology.      Assessment Plan:  83 year old female with past history of HTN, DM, PVD, CKD3 with anemia of chronic disease, CATE, documented CHF with normal EF this admission, who initially presented to [place] for weakness and was found to be hypoglycemic (lowest fsg 29) and with a pericardial effusion. s/p pericardiocentesis with drain placement on 2/26. EP consulted for abnormal EKG. Baseline EKG with 1st degree AVB and intermittent LBBB. Pt without history of syncope.    -no acute indication for EP intervention at this time  -will continue to monitor telemetry during hospital stay         HPI:     3 year old female with past history of HTN, DM, PVD, CKD3 with anemia of chronic disease, CATE,history of pericardial effusion in 2021,, documented CHF with normal EF this admission, who initially presented to [place] for weakness and was found to be hypoglycemic (lowest fsg 29) and with a pericardial effusion. s/p pericardiocentesis with drain placement on 2/26.     EP consulted for abnormal EKG.   baseline EKG shows normal sinus rhythm with 1st degree AVB with VA interval ~230ms with intermittent LBBB around 150ms. baseline QRS appears to be ~120ms. unclear if it is rate related, the patient does not have any bradycardia or high degree av block on telemetry.      PAST MEDICAL & SURGICAL HISTORY:  see above      Social History: no smoking, no drugs, no algohol    pertinent home medications:    Inpatient Medications:   acetaminophen     Tablet .. 650 milliGRAM(s) Oral every 6 hours PRN  atorvastatin 20 milliGRAM(s) Oral at bedtime  chlorhexidine 2% Cloths 1 Application(s) Topical  enoxaparin Injectable 30 milliGRAM(s) SubCutaneous every 24 hours  insulin lispro (ADMELOG) corrective regimen sliding scale   SubCutaneous Before meals and at bedtime  NIFEdipine XL 60 milliGRAM(s) Oral every 12 hours  pantoprazole    Tablet 40 milliGRAM(s) Oral before breakfast      Allergies: Allergy Status Unknown      ROS:   CONSTITUTIONAL: No fever, weight loss + fatigue  EYES: Pt denies  RESPIRATORY: No cough, wheezing, chills or hemoptysis; No Shortness of Breath  CARDIOVASCULAR: see HPI  GASTROINTESTINAL: Pt denies  NEUROLOGICAL: Pt denies  SKIN: Pt denies   PSYCHIATRIC: Pt denies  HEME/LYMPH: Pt denies    PHYSICAL:  T(C): 37.4 (02-27-23 @ 09:00), Max: 38.2 (02-27-23 @ 01:23)  HR: 80 (02-27-23 @ 15:00) (65 - 94)  BP: 149/63 (02-27-23 @ 15:00) (114/63 - 163/70)  RR: 24 (02-27-23 @ 15:00) (15 - 32)  SpO2: 96% (02-27-23 @ 15:00) (95% - 100%)  Wt(kg): 77kG  Appearance: No acute distress, well developed  Eyes: normal appearing conjunctiva, pupils and eyelids  Cardiovascular: Normal S1 S2, No JVD, No murmurs, No edema  Respiratory: Lungs clear to auscultation	bilaterally.  No wheeze, rhonchi, rales noted  Gastrointestinal:  Soft, NT/ND 	  Neurologic:  No deficit noted  Psych: A&Ox3, normal mood/affect  Musculoskeletal: normal gait  Skin: no rash noted, normal color and pigmentation.        LABS:                        8.8    10.34 )-----------( 154      ( 27 Feb 2023 05:30 )             27.1     02-27    144  |  109<H>  |  40<H>  ----------------------------<  169<H>  4.1   |  26  |  1.77<H>    Ca    9.3      27 Feb 2023 05:30  Phos  2.8     02-27  Mg     1.9     02-27    TPro  6.7  /  Alb  3.2<L>  /  TBili  0.3  /  DBili  x   /  AST  14  /  ALT  9<L>  /  AlkPhos  55  02-27    PT/INR - ( 27 Feb 2023 05:30 )   PT: 12.8 sec;   INR: 1.07          PTT - ( 27 Feb 2023 05:30 )  PTT:27.4 sec    ECHO:2/26:    1. Limited study obtained for evaluation of pericardial effusion performed by cardiology fellow on call.  2. Left ventricular endocardium is not well visualized. Grossly, left ventricular function appears normal.  3. Normal right ventricular size and systolic function.  4. Large pericardial effusion with echocardiographic evidence of cardiac tamponade physiology.      Assessment Plan:  83 year old female with past history of HTN, DM, PVD, CKD3 with anemia of chronic disease, CATE, history of pericardial effusion in 2021,, documented CHF with normal EF this admission, who initially presented to [place] for weakness and was found to be hypoglycemic (lowest fsg 29) and with a pericardial effusion. s/p pericardiocentesis with drain placement on 2/26. EP consulted for abnormal EKG. Baseline EKG with 1st degree AVB and intermittent LBBB. Pt without history of syncope.    -no acute indication for EP intervention at this time  -will continue to monitor telemetry during hospital stay

## 2023-02-27 NOTE — PROGRESS NOTE ADULT - SUBJECTIVE AND OBJECTIVE BOX
Ashely Roldan is on Warfarin and we have not received an INR on her since June 2017.  She has had several calls regarding this by [facility] coag clinic as well as the [facility] coag clinic and we still have not received any lab work from this patient.   I checked with Dr. Rdz's office to see if they were following it because she had a refill called in from there.   But they were not regulating Warfarin and ordered it because she had told them they could not get hold of us to refill it.   Patient discussed on morning rounds with Dr. Faust    OPERATION & DATE: 23 -- pericardiocentesis     SUBJECTIVE ASSESSMENT: Pt is feeling well this morning, states she feels improved from yesterday. Hesitant to get up to the chair today but is agreeable. Comfortable and denies any chest pain, palpitations, orthopnea, dyspnea on exertion, shortness of breath, wheezing, abd pain, nausea, vomiting, constipation, lightheadedness, headaches, fevers, or chills.    VITAL SIGNS:  Vital Signs Last 24 Hrs  T(C): 37.4 (:00), Max: 38.2 (:23)  T(F): 99.3 (:), Max: 100.8 (:)  HR: 80 (:) (65 - 94)  BP: 154/68 (:) (114/63 - 163/70)  BP(mean): 98 (:) (82 - 101)  RR: 28 (:) (15 - 31)  SpO2: 100% (:) (99% - 100%)    Parameters below as of 2023 11:00  Patient On (Oxygen Delivery Method): room air      I&O's Detail    2023 07:01  -  2023 07:00  --------------------------------------------------------  IN:    IV PiggyBack: 100 mL  Total IN: 100 mL    OUT:    Drain (mL): 175 mL    Voided (mL): 1200 mL  Total OUT: 1375 mL    Total NET: -1275 mL      2023 07:01  -  2023 12:09  --------------------------------------------------------  IN:  Total IN: 0 mL    OUT:    Drain (mL): 60 mL    Voided (mL): 150 mL  Total OUT: 210 mL    Total NET: -210 mL    CHEST TUBE:  no  PERICARDIAL DRAIN: YES  TIEN DRAIN:  no  EPICARDIAL WIRES:  no  STITCHES: no  STAPLES: no  MORENO:  no  CENTRAL LINE: no  MIDLINE/PICC: no  WOUND VAC: no    PHYSICAL EXAM:  General: well appearing sitting in chair in NAD   Neurological: AOx3. Motor skills grossly intact  Cardiovascular: Normal S1/S2. Regular rate/rhythm. No murmurs  Respiratory: Lungs CTA bilaterally. No wheezing or rales  Gastrointestinal: +BS in all 4 quadrants. Non-distended. Soft. Non-tender  Extremities: No edema. No calf tenderness.  Vascular: Radial 2+bilaterally, DP 2+ b/l  Incision Sites: left pericardial drain in place, serous     LABS:                        8.8    10.34 )-----------( 154      ( 2023 05:30 )             27.1     PT/INR - ( 2023 05:30 )   PT: 12.8 sec;   INR: 1.07          PTT - ( 2023 05:30 )  PTT:27.4 sec      144  |  109<H>  |  40<H>  ----------------------------<  169<H>  4.1   |  26  |  1.77<H>    Ca    9.3      2023 05:30  Phos  2.8       Mg     1.9         TPro  6.7  /  Alb  3.2<L>  /  TBili  0.3  /  DBili  x   /  AST  14  /  ALT  9<L>  /  AlkPhos  55  27    Urinalysis Basic - ( 2023 01:17 )    Color: Yellow / Appearance: Clear / S.020 / pH: x  Gluc: x / Ketone: NEGATIVE  / Bili: Negative / Urobili: 0.2 E.U./dL   Blood: x / Protein: NEGATIVE mg/dL / Nitrite: NEGATIVE   Leuk Esterase: Trace / RBC: < 5 /HPF / WBC < 5 /HPF   Sq Epi: x / Non Sq Epi: 0-5 /HPF / Bacteria: Present /HPF      MEDICATIONS  (STANDING):  atorvastatin 20 milliGRAM(s) Oral at bedtime  chlorhexidine 2% Cloths 1 Application(s) Topical <User Schedule>  enoxaparin Injectable 30 milliGRAM(s) SubCutaneous every 24 hours  glucagon  Injectable 1 milliGRAM(s) IntraMuscular once  influenza  Vaccine (HIGH DOSE) 0.7 milliLiter(s) IntraMuscular once  insulin lispro (ADMELOG) corrective regimen sliding scale   SubCutaneous Before meals and at bedtime  NIFEdipine XL 60 milliGRAM(s) Oral every 12 hours  pantoprazole    Tablet 40 milliGRAM(s) Oral before breakfast    MEDICATIONS  (PRN):  acetaminophen     Tablet .. 650 milliGRAM(s) Oral every 6 hours PRN Temp greater or equal to 38C (100.4F), Mild Pain (1 - 3), Moderate Pain (4 - 6)  dextrose Oral Gel 15 Gram(s) Oral once PRN Blood Glucose LESS THAN 70 milliGRAM(s)/deciliter    RADIOLOGY & ADDITIONAL TESTS:  < from: Xray Chest 1 View- PORTABLE-Routine (Xray Chest 1 View- PORTABLE-Routine in AM.) (23 @ 06:43) >  Similar appearance to prior exam earlier same day with pericardial   drainage catheter again noted. Small pleural effusions. No acute   infiltrate appearing. No pneumothorax.  < end of copied text >

## 2023-02-27 NOTE — PROGRESS NOTE ADULT - NS ATTEND AMEND GEN_ALL_CORE FT
Agree with above with the following comments.    Pt is s/p successful pericardiocentesis with removal of large volume of serous discharge. Fluid analysis pending. TTE this AM without sig pericardial fluid left. However, drain still put out a significant amount of fluid overnight. Keep drain in today and monitor outputs. F/u Fluid analysis. Repeat TTE in AM.

## 2023-02-28 LAB
ALBUMIN SERPL ELPH-MCNC: 2.9 G/DL — LOW (ref 3.3–5)
ALP SERPL-CCNC: 53 U/L — SIGNIFICANT CHANGE UP (ref 40–120)
ALT FLD-CCNC: 9 U/L — LOW (ref 10–45)
ANION GAP SERPL CALC-SCNC: 7 MMOL/L — SIGNIFICANT CHANGE UP (ref 5–17)
AST SERPL-CCNC: 14 U/L — SIGNIFICANT CHANGE UP (ref 10–40)
BASOPHILS # BLD AUTO: 0.01 K/UL — SIGNIFICANT CHANGE UP (ref 0–0.2)
BASOPHILS NFR BLD AUTO: 0.1 % — SIGNIFICANT CHANGE UP (ref 0–2)
BILIRUB SERPL-MCNC: 0.4 MG/DL — SIGNIFICANT CHANGE UP (ref 0.2–1.2)
BUN SERPL-MCNC: 42 MG/DL — HIGH (ref 7–23)
CALCIUM SERPL-MCNC: 9.6 MG/DL — SIGNIFICANT CHANGE UP (ref 8.4–10.5)
CHLORIDE SERPL-SCNC: 105 MMOL/L — SIGNIFICANT CHANGE UP (ref 96–108)
CO2 SERPL-SCNC: 26 MMOL/L — SIGNIFICANT CHANGE UP (ref 22–31)
CREAT SERPL-MCNC: 1.99 MG/DL — HIGH (ref 0.5–1.3)
CULTURE RESULTS: SIGNIFICANT CHANGE UP
EGFR: 24 ML/MIN/1.73M2 — LOW
EOSINOPHIL # BLD AUTO: 0.13 K/UL — SIGNIFICANT CHANGE UP (ref 0–0.5)
EOSINOPHIL NFR BLD AUTO: 1.5 % — SIGNIFICANT CHANGE UP (ref 0–6)
GLUCOSE BLDC GLUCOMTR-MCNC: 153 MG/DL — HIGH (ref 70–99)
GLUCOSE BLDC GLUCOMTR-MCNC: 161 MG/DL — HIGH (ref 70–99)
GLUCOSE BLDC GLUCOMTR-MCNC: 196 MG/DL — HIGH (ref 70–99)
GLUCOSE SERPL-MCNC: 120 MG/DL — HIGH (ref 70–99)
HCT VFR BLD CALC: 26.8 % — LOW (ref 34.5–45)
HGB BLD-MCNC: 8.8 G/DL — LOW (ref 11.5–15.5)
IMM GRANULOCYTES NFR BLD AUTO: 0.6 % — SIGNIFICANT CHANGE UP (ref 0–0.9)
LYMPHOCYTES # BLD AUTO: 2.16 K/UL — SIGNIFICANT CHANGE UP (ref 1–3.3)
LYMPHOCYTES # BLD AUTO: 25.3 % — SIGNIFICANT CHANGE UP (ref 13–44)
MAGNESIUM SERPL-MCNC: 1.8 MG/DL — SIGNIFICANT CHANGE UP (ref 1.6–2.6)
MCHC RBC-ENTMCNC: 30.3 PG — SIGNIFICANT CHANGE UP (ref 27–34)
MCHC RBC-ENTMCNC: 32.8 GM/DL — SIGNIFICANT CHANGE UP (ref 32–36)
MCV RBC AUTO: 92.4 FL — SIGNIFICANT CHANGE UP (ref 80–100)
MONOCYTES # BLD AUTO: 0.86 K/UL — SIGNIFICANT CHANGE UP (ref 0–0.9)
MONOCYTES NFR BLD AUTO: 10.1 % — SIGNIFICANT CHANGE UP (ref 2–14)
NEUTROPHILS # BLD AUTO: 5.32 K/UL — SIGNIFICANT CHANGE UP (ref 1.8–7.4)
NEUTROPHILS NFR BLD AUTO: 62.4 % — SIGNIFICANT CHANGE UP (ref 43–77)
NON-GYNECOLOGICAL CYTOLOGY STUDY: SIGNIFICANT CHANGE UP
NRBC # BLD: 0 /100 WBCS — SIGNIFICANT CHANGE UP (ref 0–0)
PHOSPHATE SERPL-MCNC: 2.9 MG/DL — SIGNIFICANT CHANGE UP (ref 2.5–4.5)
PLATELET # BLD AUTO: 125 K/UL — LOW (ref 150–400)
POTASSIUM SERPL-MCNC: 4.2 MMOL/L — SIGNIFICANT CHANGE UP (ref 3.5–5.3)
POTASSIUM SERPL-SCNC: 4.2 MMOL/L — SIGNIFICANT CHANGE UP (ref 3.5–5.3)
PROT SERPL-MCNC: 6.8 G/DL — SIGNIFICANT CHANGE UP (ref 6–8.3)
RBC # BLD: 2.9 M/UL — LOW (ref 3.8–5.2)
RBC # FLD: 16.2 % — HIGH (ref 10.3–14.5)
SODIUM SERPL-SCNC: 138 MMOL/L — SIGNIFICANT CHANGE UP (ref 135–145)
SPECIMEN SOURCE: SIGNIFICANT CHANGE UP
WBC # BLD: 8.53 K/UL — SIGNIFICANT CHANGE UP (ref 3.8–10.5)
WBC # FLD AUTO: 8.53 K/UL — SIGNIFICANT CHANGE UP (ref 3.8–10.5)

## 2023-02-28 PROCEDURE — 76937 US GUIDE VASCULAR ACCESS: CPT | Mod: 26

## 2023-02-28 PROCEDURE — 36000 PLACE NEEDLE IN VEIN: CPT

## 2023-02-28 PROCEDURE — 93308 TTE F-UP OR LMTD: CPT | Mod: 26

## 2023-02-28 PROCEDURE — 71045 X-RAY EXAM CHEST 1 VIEW: CPT | Mod: 26

## 2023-02-28 PROCEDURE — 99232 SBSQ HOSP IP/OBS MODERATE 35: CPT

## 2023-02-28 PROCEDURE — 99291 CRITICAL CARE FIRST HOUR: CPT

## 2023-02-28 RX ORDER — SODIUM CHLORIDE 9 MG/ML
1000 INJECTION INTRAMUSCULAR; INTRAVENOUS; SUBCUTANEOUS
Refills: 0 | Status: ACTIVE | OUTPATIENT
Start: 2023-02-28 | End: 2023-02-28

## 2023-02-28 RX ORDER — MAGNESIUM SULFATE 500 MG/ML
1 VIAL (ML) INJECTION ONCE
Refills: 0 | Status: COMPLETED | OUTPATIENT
Start: 2023-02-28 | End: 2023-02-28

## 2023-02-28 RX ADMIN — Medication 60 MILLIGRAM(S): at 06:13

## 2023-02-28 RX ADMIN — SODIUM CHLORIDE 100 MILLILITER(S): 9 INJECTION INTRAMUSCULAR; INTRAVENOUS; SUBCUTANEOUS at 10:55

## 2023-02-28 RX ADMIN — ENOXAPARIN SODIUM 30 MILLIGRAM(S): 100 INJECTION SUBCUTANEOUS at 10:03

## 2023-02-28 RX ADMIN — Medication 2: at 10:33

## 2023-02-28 RX ADMIN — Medication 100 GRAM(S): at 06:13

## 2023-02-28 RX ADMIN — CHLORHEXIDINE GLUCONATE 1 APPLICATION(S): 213 SOLUTION TOPICAL at 06:13

## 2023-02-28 RX ADMIN — Medication 2: at 15:33

## 2023-02-28 RX ADMIN — PANTOPRAZOLE SODIUM 40 MILLIGRAM(S): 20 TABLET, DELAYED RELEASE ORAL at 06:13

## 2023-02-28 NOTE — PROGRESS NOTE ADULT - ASSESSMENT
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     NEURO  JOSE J    CV  #Pericardial effusion  s/p pericardiocentesis in 07/2021 in [place]. On home lasix 40mg BID. Bedside echo 2/26: IVC collapsable, RA early diastolic collapse, early cardiac tamponade physiology. Echo 2/27: limited 2/2 pericardial effusion with grossly normal LV function w interval drainage of pericardial effusion compared to 2/26. Small Pleural Effusions on CXR   - S/p pericardial drain placement w 295 drainage/24 hour period w 110 o/n w   - f/u pericardial fluid sterile w plasma cells, will do MM workup (concurrent anemia, borderline Ca levels)   - Continue to hold lasix, UOP net negative    #CHF  Pt with hx of CHF, unknown last EF. On home carvedilol 12.5 mg BID, enalapril 10mg. BNP and trop WNL at [place]. Not in current exacerbation. Extremities WWP.  - Patient mildly hypertensive (improving) continue to hold home carvedilol and enalapril  - c/w nifedipine 60 BID   - EP consulted w/o need for intervention     #1st Degree AVB w intermittent LBBB (on presentation)  - BL EKG NSR w 1st degree AVB w prolonged OH interval (apx 230). Patient w/o bradycardia or high degree block on telemetry. Presenting w trop 0.03, denies CP. Patient w/o sx of CP, SOB, lightheadedness  - EP consulted w/o need for intervention     #PVD  Hx of LE PVD. On [id number] and Atorvastatin 20mg. Per pt, no hx of stents. No current symptoms w elevated cholesterol and LDL, decreased HDL   -c/w Atorvastatin 20mg qd     #HTN  Pt with hx of HTN, on home nifedipine 60mg daily  - c/w nifedipine 60 BID     PULM  #CATE  Pt with hx of CATE, on home O2, around 4L (inconsistently)   - Satting well on RA w BIPAP at night   - c/w  Incentive spirometer      GI  #GERD  Pt with hx of GERD, on home pantoprazole 40mg daily  -c.w pantoprazole 40mg daily    RENAL  #CKD stage III w hypokalemia  Pt with Cr 1.8 on admission, increasing on admission   - trend BUN/Cr daily  - f/u repeat labs   - Replete K daily     ENDO  #Dm  Admitted to [facility] for hypoglycemia to reported 49mg/dl, 39mg/dL, then 29 mg/dL on home finger stick. On home glipizide 10mg qd, Sitagliptin 50mg.  -started on sliding scale lispro  -monitor FSG q6    HEME/ONC  #Anemia  Hgb 8.8 likely from CKD. No signs of active bleeding. Denies melena, hematuria, or hemoptysis.  Plan:  -maintain active T&S  -transfuse hgb<7    ID  Fever 2/26 overnight w Tmax 100.8, non-leukocytotic w elevated procal 1.03 w/o CP, subjective fever, SOB. 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 w w/o of etiology for effusion with monitoring of drain output.     NEURO  JOSE J    CV  #Pericardial effusion  s/p pericardiocentesis in 07/2021 in [place]. On home lasix 40mg BID. Bedside echo 2/26: IVC collapsable, RA early diastolic collapse, early cardiac tamponade physiology. Echo 2/27: limited 2/2 pericardial effusion with grossly normal LV function w interval drainage of pericardial effusion compared to 2/26. Small Pleural Effusions on CXR   - S/p pericardial drain placement w 295 drainage/24 hour period w 110 o/n w   - f/u pericardial fluid sterile w plasma cells, will do MM workup (concurrent anemia, borderline Ca levels)   - Continue to hold lasix, UOP net negative  - PT w importance of OOBTC   - Incentive spirometer     #CHF  Pt with hx of CHF, unknown last EF. On home carvedilol 12.5 mg BID, enalapril 10mg. BNP and trop WNL at [place]. Not in current exacerbation. Extremities WWP.  - Patient mildly hypertensive (improving) continue to hold home carvedilol and enalapril  - c/w nifedipine 60 BID   - EP consulted w/o need for intervention     #1st Degree AVB w intermittent LBBB (on presentation)  BL EKG NSR w 1st degree AVB w prolonged SD interval (apx 230). Patient w/o bradycardia or high degree block on telemetry. Presenting w trop 0.03, denies CP. Patient w/o sx of CP, SOB, lightheadedness. Patient w no known ischemic history   - EP consulted w/o need for intervention     #PVD  Hx of LE PVD. On [id number] and Atorvastatin 20mg. Per pt, no hx of stents. No current symptoms w elevated cholesterol and LDL, decreased HDL   -c/w Atorvastatin 20mg qd     #HTN  Pt with hx of HTN, on home nifedipine 60mg daily  - c/w nifedipine 60 BID     PULM  #CATE  Pt with hx of CATE, on home O2, around 4L (inconsistently)   - Satting well on RA w BIPAP at night   - c/w  Incentive spirometer      GI  #GERD  Pt with hx of GERD, on home pantoprazole 40mg daily  -c.w pantoprazole 40mg daily    RENAL  #CKD stage III w hypokalemia  Pt with Cr 1.8 on admission, increasing on admission   - Maintenance fluids for patient   - trend BUN/Cr daily  - f/u repeat labs   - Replete K daily     ENDO  #Dm  Admitted to [place] for hypoglycemia to reported 49mg/dl, 39mg/dL, then 29 mg/dL on home finger stick. On home glipizide 10mg qd, Sitagliptin 50mg.  -started on sliding scale lispro  -monitor FSG q6    HEME/ONC  #Anemia  Hgb 8.8 likely from CKD. No signs of active bleeding. Denies melena, hematuria, or hemoptysis.  Plan:  -maintain active T&S  -transfuse hgb<7    ID  Fever 2/26 overnight w Tmax 100.8, non-leukocytotic w elevated procal 1.03 w/o CP, subjective fever, SOB. 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     NEURO  JOSE J    CV  #Pericardial effusion  s/p pericardiocentesis in 07/2021 in [place]. On home lasix 40mg BID. Bedside echo 2/26: IVC collapsable, RA early diastolic collapse, early cardiac tamponade physiology. Echo 2/27: limited 2/2 pericardial effusion with grossly normal LV function w interval drainage of pericardial effusion compared to 2/26. Small Pleural Effusions on CXR   - S/p pericardial drain placement w 295 drainage/24 hour period w 110 o/n w   - f/u pericardial fluid sterile w plasma cells, will do MM workup (concurrent anemia, borderline Ca levels)   - Continue to hold lasix, UOP net negative  - PT w importance of OOBTC   - Incentive spirometer     #CHF  Pt with hx of CHF, unknown last EF. On home carvedilol 12.5 mg BID, enalapril 10mg. BNP and trop WNL at [place]. Not in current exacerbation. Extremities WWP.  - Patient mildly hypertensive (improving) continue to hold home carvedilol and enalapril  - c/w nifedipine 60 BID   - EP consulted w/o need for intervention     #1st Degree AVB w intermittent LBBB (on presentation)  BL EKG NSR w 1st degree AVB w prolonged MT interval (apx 230). Patient w/o bradycardia or high degree block on telemetry. Presenting w trop 0.03, denies CP. Patient w/o sx of CP, SOB, lightheadedness. Patient w no known ischemic history   - EP consulted w/o need for intervention     #PVD  Hx of LE PVD. On [id number] and Atorvastatin 20mg. Per pt, no hx of stents. No current symptoms w elevated cholesterol and LDL, decreased HDL   -c/w Atorvastatin 20mg qd     #HTN  Pt with hx of HTN, on home nifedipine 60mg daily  - c/w nifedipine 60 BID     PULM  #CATE  Pt with hx of CATE, on home O2, around 4L (inconsistently)   - Satting well on RA w BIPAP at night   - c/w  Incentive spirometer      GI  #GERD  Pt with hx of GERD, on home pantoprazole 40mg daily  -c.w pantoprazole 40mg daily    RENAL  #CKD stage III w hypokalemia  Pt with Cr 1.8 on admission, increasing on admission   - Maintenance fluids for patient   - trend BUN/Cr daily  - f/u repeat labs   - Replete K daily     ENDO  #Dm  Admitted to [place] for hypoglycemia to reported 49mg/dl, 39mg/dL, then 29 mg/dL on home finger stick. On home glipizide 10mg qd, Sitagliptin 50mg.  -started on sliding scale lispro  -monitor FSG q6    HEME/ONC  #Anemia  Hgb 8.8 likely from CKD. No signs of active bleeding. Denies melena, hematuria, or hemoptysis.  Plan:  -maintain active T&S  -transfuse hgb<7    ID  Fever 2/26 overnight w Tmax 100.8, non-leukocytotic w elevated procal 1.03 w/o CP, subjective fever, SOB. 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     NEURO  JOSE J    CV  #Pericardial effusion  s/p pericardiocentesis in 07/2021 in [place]. On home lasix 40mg BID. Bedside echo 2/26: IVC collapsable, RA early diastolic collapse, early cardiac tamponade physiology. Echo 2/27: limited 2/2 pericardial effusion with grossly normal LV function w interval drainage of pericardial effusion compared to 2/26. Small Pleural Effusions on CXR   - S/p pericardial drain placement w 295 drainage/24 hour period including 110 o/n ([id number] last 48hrs)   - Repeat TTE 2/27 w trivial effusion w grossly normal EF   - f/u pericardial fluid sterile w plasma cells  -  MM workup (concurrent anemia, borderline Ca levels) w SPEP, UPEP and NITIN   - Continue to hold lasix, UOP net negative  - PT w importance of OOBTC   - Incentive spirometer     #CHF  Pt with hx of CHF, unknown last EF. On home carvedilol 12.5 mg BID, enalapril 10mg. BNP and trop WNL at [place]. Not in current exacerbation. Extremities WWP.  - Patient mildly hypertensive (improving) continue to hold home carvedilol and enalapril  - c/w nifedipine 60 BID   - EP consulted w/o need for intervention     #1st Degree AVB w intermittent LBBB (on presentation)  BL EKG NSR w 1st degree AVB w prolonged MN interval (apx 230). Patient w/o bradycardia or high degree block on telemetry. Presenting w trop 0.03, denies CP. Patient w/o sx of CP, SOB, lightheadedness. Patient w no known ischemic history   - EP consulted w/o need for intervention   - Improvement of MN intervals to appx 190s iso d/c'd coreg    #PVD  Hx of LE PVD. On [id number] and Atorvastatin 20mg. Per pt, no hx of stents. No current symptoms w elevated cholesterol and LDL, decreased HDL   -c/w Atorvastatin 20mg qd     #HTN  Pt with hx of HTN, on home nifedipine 60mg daily  - c/w nifedipine 60 BID     PULM  #CATE  Pt with hx of CATE, on home O2, around 4L (inconsistently)   - Satting well on RA w BIPAP at night   - c/w  Incentive spirometer      GI  #GERD  Pt with hx of GERD, on home pantoprazole 40mg daily  -c.w pantoprazole 40mg daily    RENAL  #CKD stage III w hypokalemia  Pt with Cr 1.8 on admission, increasing on admission   - Maintenance fluids for patient   - trend BUN/Cr daily  - Replete K daily   - Encourage PO intake    ENDO  #Dm  Admitted to [place] for hypoglycemia to reported 49mg/dl, 39mg/dL, then 29 mg/dL on home finger stick. On home glipizide 10mg qd, Sitagliptin 50mg.  -started on sliding scale lispro  -monitor FSG q6    HEME/ONC  #Anemia  Hgb 8.8 likely from CKD. No signs of active bleeding. Denies melena, hematuria, or hemoptysis.  Plan:  -maintain active T&S  -transfuse hgb<7    ID  Fever 2/26 overnight w Tmax 100.8, non-leukocytotic w elevated procal 1.03 w/o CP, subjective fever, SOB. 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     NEURO  JOSE J    CV  #Pericardial effusion  s/p pericardiocentesis in 07/2021 in [place]. On home lasix 40mg BID. Bedside echo 2/26: IVC collapsable, RA early diastolic collapse, early cardiac tamponade physiology. Echo 2/27: limited 2/2 pericardial effusion with grossly normal LV function w interval drainage of pericardial effusion compared to 2/26. Small Pleural Effusions on CXR   - S/p pericardial drain placement w 295 drainage/24 hour period including 110 o/n ([id number] last 48hrs)   - Repeat TTE 2/27 w trivial effusion w grossly normal EF   - f/u pericardial fluid sterile w plasma cells  -  MM workup (concurrent anemia, borderline Ca levels) w SPEP, UPEP and NITIN   - Continue to hold lasix, UOP net negative  - PT w importance of OOBTC   - Incentive spirometer     #CHF  Pt with hx of CHF, unknown last EF. On home carvedilol 12.5 mg BID, enalapril 10mg. BNP and trop WNL at [place]. Not in current exacerbation. Extremities WWP.  - Patient mildly hypertensive (improving) continue to hold home carvedilol and enalapril  - c/w nifedipine 60 BID   - EP consulted w/o need for intervention     #1st Degree AVB w intermittent LBBB (on presentation)  BL EKG NSR w 1st degree AVB w prolonged OH interval (apx 230). Patient w/o bradycardia or high degree block on telemetry. Presenting w trop 0.03, denies CP. Patient w/o sx of CP, SOB, lightheadedness. Patient w no known ischemic history   - EP consulted w/o need for intervention   - Improvement of OH intervals to appx 190s iso d/c'd coreg    #PVD  Hx of LE PVD. On [id number] and Atorvastatin 20mg. Per pt, no hx of stents. No current symptoms w elevated cholesterol and LDL, decreased HDL   -c/w Atorvastatin 20mg qd     #HTN  Pt with hx of HTN, on home nifedipine 60mg daily  - c/w nifedipine 60 BID     PULM  #CATE  Pt with hx of CATE, on home O2, around 4L (inconsistently)   - Satting well on RA w BIPAP at night   - c/w  Incentive spirometer      GI  #GERD  Pt with hx of GERD, on home pantoprazole 40mg daily  -c.w pantoprazole 40mg daily    RENAL  #CKD stage III w hypokalemia  Pt with Cr 1.8 on admission, increasing on admission   - Maintenance fluids for patient   - trend BUN/Cr daily  - Replete K daily   - Encourage PO intake    ENDO  #Dm  Admitted to [place] for hypoglycemia to reported 49mg/dl, 39mg/dL, then 29 mg/dL on home finger stick. On home glipizide 10mg qd, Sitagliptin 50mg.  -started on sliding scale lispro  -monitor FSG q6    HEME/ONC  #Anemia  Hgb 8.8 likely from CKD. No signs of active bleeding. Denies melena, hematuria, or hemoptysis.  Plan:  -maintain active T&S  -transfuse hgb<7    ID  Fever 2/26 overnight w Tmax 100.8, non-leukocytotic w elevated procal 1.03 w/o CP, subjective fever, SOB. 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     NEURO  JOSE J    CV  #Pericardial effusion  s/p pericardiocentesis in 07/2021 in [place]. On home lasix 40mg BID. Bedside echo 2/26: IVC collapsable, RA early diastolic collapse, early cardiac tamponade physiology. Echo 2/27: limited 2/2 pericardial effusion with grossly normal LV function w interval drainage of pericardial effusion compared to 2/26. Small Pleural Effusions on CXR   - S/p pericardial drain placement w 295 drainage/24 hour period including 110 o/n ([id number] last 48hrs)   - Repeat TTE 2/27 w trivial effusion w grossly normal EF   - f/u pericardial fluid sterile w plasma cells  -  MM workup (concurrent anemia, borderline Ca levels) w SPEP, UPEP and NITIN   - Continue to hold lasix, UOP net negative  - PT w importance of OOBTC   - Incentive spirometer     #CHF  Pt with hx of CHF, unknown last EF. On home carvedilol 12.5 mg BID, enalapril 10mg. BNP and trop WNL at [place]. Not in current exacerbation. Extremities WWP.  - Patient mildly hypertensive (improving) continue to hold home carvedilol and enalapril  - c/w nifedipine 60 BID     #1st Degree AVB w intermittent LBBB   Baseline EKG NSR w 1st degree AVB w prolonged ND interval (apx 230). Patient w/o bradycardia or high degree block on telemetry. Presenting w trop 0.03 denies CP. Patient w/o sx of CP, SOB, lightheadedness. Patient w no known ischemic history   - EP consulted w/o need for intervention   - Improvement of ND intervals to appx 190s iso d/c'd coreg    #PVD  Hx of LE PVD. On [id number] and Atorvastatin 20mg. Per pt, no hx of stents. No current symptoms w elevated cholesterol and LDL, decreased HDL   -c/w Atorvastatin 20mg qd     #HTN  Pt with hx of HTN, on home nifedipine 60mg daily  - c/w nifedipine 60 BID     PULM  #CATE  Pt with hx of CATE, on home O2, around 4L (inconsistently)   - Satting well on RA w BIPAP at night   - c/w  Incentive spirometer      GI  #GERD  Pt with hx of GERD, on home pantoprazole 40mg daily  -c.w pantoprazole 40mg daily    RENAL  #CKD stage III w hypokalemia  Pt with Cr 1.8 on admission, increasing on admission   - Maintenance fluids NS   - trend BUN/Cr daily  - Replete K daily   - Encourage PO intake    ENDO  #Dm  Admitted to [place] for hypoglycemia to reported 49mg/dl, 39mg/dL, then 29 mg/dL on home finger stick. On home glipizide 10mg qd, Sitagliptin 50mg.  -started on sliding scale lispro  -monitor FSG q6    HEME/ONC  #Anemia  Hgb 8.8 likely from CKD. No signs of active bleeding. Denies melena, hematuria, or hemoptysis.  Plan:  -maintain active T&S  -transfuse hgb<7    ID  Fever 2/26 overnight w Tmax 100.8, non-leukocytotic w elevated procal 1.03 w/o CP, subjective fever, SOB. 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 w near syncope  found to be hypoglycemia w fingersticks as low as 29 found to have pericardial effusion s/p repeat pericardial drain at [facility] w w/o of etiology for effusion with monitoring of drain output.     NEURO  JOSE J    CV  #Pericardial effusion  s/p pericardiocentesis in 07/2021 in [place]. On home lasix 40mg BID. Bedside echo 2/26: IVC collapsable, RA early diastolic collapse, early cardiac tamponade physiology. Echo 2/27: limited 2/2 pericardial effusion with grossly normal LV function w interval drainage of pericardial effusion compared to 2/26. Small Pleural Effusions on CXR   - S/p pericardial drain placement w 295 drainage/24 hour period including 110 o/n ([id number] last 48hrs) w repeat TTE 2/27 w trivial effusion w grossly normal EF   - Pericardial fluid sterile w plasma cells w MM workup (concurrent anemia, borderline Ca levels) w SPEP, UPEP and NITIN   - Continue to hold lasix, UOP net negative  - PT w importance of OOBTC   - Incentive spirometer     #CHF  Pt with hx of CHF, unknown last EF. On home carvedilol 12.5 mg BID, enalapril 10mg. BNP and trop WNL at [place]. Not in current exacerbation. Extremities WWP.  - Patient mildly hypertensive (improving) continue to hold home carvedilol and enalapril  - c/w nifedipine 60 BID     #1st Degree AVB w intermittent LBBB   Baseline EKG NSR w 1st degree AVB w prolonged SC interval (apx 230). Patient w/o bradycardia or high degree block on telemetry. Presenting w trop 0.03 denies CP. Patient w/o sx of CP, SOB, lightheadedness. Patient w no known ischemic history   - EP consulted w/o need for intervention   - Improvement of SC intervals to appx 190s iso d/c'd coreg    #PVD  Hx of LE PVD. On [id number] and Atorvastatin 20mg. Per pt, no hx of stents. No current symptoms w elevated cholesterol and LDL, decreased HDL   -c/w Atorvastatin 20mg qd     #HTN  Pt with hx of HTN, on home nifedipine 60mg daily  - c/w nifedipine 60 BID     PULM  #CATE  Pt with hx of CATE, on home O2, around 4L (inconsistently)   - Satting well on RA w BIPAP at night   - c/w  Incentive spirometer      GI  #GERD  Pt with hx of GERD, on home pantoprazole 40mg daily  -c.w pantoprazole 40mg daily    RENAL  #CKD stage III w hypokalemia  Pt with Cr 1.8 on admission, increasing on admission   - Maintenance fluids NS   - trend BUN/Cr daily  - Replete K daily   - Encourage PO intake    ENDO  #Dm  Admitted to [place] for hypoglycemia to reported 49mg/dl, 39mg/dL, then 29 mg/dL on home finger stick. On home glipizide 10mg qd, Sitagliptin 50mg.  -started on sliding scale lispro  -monitor FSG q6    HEME/ONC  #Anemia  Hgb 8.8 likely from CKD. No signs of active bleeding. Denies melena, hematuria, or hemoptysis.  Plan:  -maintain active T&S  -transfuse hgb<7    ID  Fever 2/26 overnight w Tmax 100.8, non-leukocytotic w elevated procal 1.03 w/o CP, subjective fever, SOB. UA neg likely 2/2 reactive fever due to insertion of pericardial drain (2/26)  - Blood Cultures (2/27) NGTD   - Tylenol PRN  - Monitoring off of Abx    F: NS  E: replete prn, particular attention to hypokalemia   N: Regular Diet + Ensure Supplementation   DVT: Lovenox

## 2023-02-28 NOTE — PROGRESS NOTE ADULT - SUBJECTIVE AND OBJECTIVE BOX
TU ADKINS 83y Female  MRN#: [id number]     SUBJECTIVE  Today is hospital day 2d (s/p pericardial drain) and this morning she reports decreased pain from her pericardial drain site. In addition the patient does not have any chest pain, SOB, headache, nausea, vomiting or diarrhea. The patient reports that she has a reduction of oral intake due to her not liking the food options. Otherwise the patient does not complain of any pain in her extremities (particularly dull, prolonged pain), no dysuria, or increased urinary frequency.     Overnight, the patient was febrile to 100.4 F (Tmax 100.8 no repeat BCx obtained) w current Cx NGTD. Mg repleted. Ca 10.4, monitor for hypercalcemia. 110 cc mariela drain overnight.    OBJECTIVE  PAST MEDICAL & SURGICAL HISTORY    Home Meds:    ALLERGIES:  Allergy Status Unknown    MEDICATIONS:  STANDING MEDICATIONS  atorvastatin 20 milliGRAM(s) Oral at bedtime  chlorhexidine 2% Cloths 1 Application(s) Topical <User Schedule>  dextrose 5%. 1000 milliLiter(s) IV Continuous <Continuous>  dextrose 5%. 1000 milliLiter(s) IV Continuous <Continuous>  dextrose 50% Injectable 25 Gram(s) IV Push once  dextrose 50% Injectable 12.5 Gram(s) IV Push once  dextrose 50% Injectable 25 Gram(s) IV Push once  enoxaparin Injectable 30 milliGRAM(s) SubCutaneous every 24 hours  glucagon  Injectable 1 milliGRAM(s) IntraMuscular once  influenza  Vaccine (HIGH DOSE) 0.7 milliLiter(s) IntraMuscular once  insulin lispro (ADMELOG) corrective regimen sliding scale   SubCutaneous Before meals and at bedtime  NIFEdipine XL 60 milliGRAM(s) Oral every 12 hours  pantoprazole    Tablet 40 milliGRAM(s) Oral before breakfast    PRN MEDICATIONS  acetaminophen     Tablet .. 650 milliGRAM(s) Oral every 6 hours PRN  dextrose Oral Gel 15 Gram(s) Oral once PRN      VITAL SIGNS: Last 24 Hours  T(C): 37 (2023 05:30), Max: 38.1 (2023 19:00)  T(F): 98.6 (2023 05:30), Max: 100.5 (2023 19:00)  HR: 84 (2023 07:00) (63 - 85)  BP: 139/63 (2023 07:00) (122/58 - 156/67)  BP(mean): 91 (2023 07:00) (83 - 98)  RR: 28 (2023 07:00) (13 - 32)  SpO2: 93% (2023 07:00) (92% - 100%)    LABS:                        8.8    8.53  )-----------( 125      ( 2023 05:10 )             26.8         138  |  105  |  42<H>  ----------------------------<  120<H>  4.2   |  26  |  1.99<H>    Ca    9.6      2023 05:10  Phos  2.9       Mg     1.8         TPro  6.8  /  Alb  2.9<L>  /  TBili  0.4  /  DBili  x   /  AST  14  /  ALT  9<L>  /  AlkPhos  53      PT/INR - ( 2023 05:30 )   PT: 12.8 sec;   INR: 1.07          PTT - ( 2023 05:30 )  PTT:27.4 sec  Urinalysis Basic - ( 2023 01:17 )    Color: Yellow / Appearance: Clear / S.020 / pH: x  Gluc: x / Ketone: NEGATIVE  / Bili: Negative / Urobili: 0.2 E.U./dL   Blood: x / Protein: NEGATIVE mg/dL / Nitrite: NEGATIVE   Leuk Esterase: Trace / RBC: < 5 /HPF / WBC < 5 /HPF   Sq Epi: x / Non Sq Epi: 0-5 /HPF / Bacteria: Present /HPF    Culture - Fungal, Body Fluid (collected 2023 10:02)  Source: .Body Fluid FUNGAL-PERICARDIAL FLUID CULTURE  Preliminary Report (2023 07:12):    Testing in progress    Culture - Body Fluid with Gram Stain (collected 2023 10:02)  Source: .Body Fluid PERICARDIAL FLUID CULTURE  Gram Stain (2023 16:28):    No organisms seen    Few WBC's  Preliminary Report (2023 08:01):    No growth to date    Culture - Blood (collected 2023 01:47)  Source: .Blood Blood  Preliminary Report (2023 03:00):    No growth at 1 day.    Culture - Blood (collected 2023 01:47)  Source: .Blood Blood  Preliminary Report (2023 03:00):    No growth at 1 day.      PHYSICAL EXAM:  General: +tired appearing, AAOx3  HEENT: atraumatic, normocephalic  Pulmonary: +decreased breath sounds in the LLL, RRL normal breath sounds, no wheezing, no increased WOB  Cardiovascular: Regular rate and rhythm; + minimal friction rub, no rubs or gallops. Normal S1S2  Gastrointestinal: Soft, nontender, nondistended; bowel sounds present  Musculoskeletal: 2+ peripheral pulses palpable, no clubbing, cyanosis +mild edema in the BL lower extremities - non-pitting   Neurology: Pt. alert and oriented, fluent speech, able to move all extremities  Skin: L Hand blisters on knuckle area  TU ADKINS 83y Female  MRN#: [id number]     SUBJECTIVE  Hospital day 2d (s/p pericardial drain) and this morning she reports decreased pain from her pericardial drain site. In addition the patient does not have any chest pain, SOB, headache, nausea, vomiting or diarrhea. The patient reports that she has a reduction of oral intake due to her not liking the food options. Otherwise the patient does not complain of any pain in her extremities (particularly dull, prolonged pain), no dysuria, or increased urinary frequency.     Overnight, the patient was febrile to 100.4 F (Tmax 100.8 no repeat BCx obtained) w current Cx NGTD. Mg repleted. Ca 10.4, monitor for hypercalcemia. 110 cc mariela drain overnight.    OBJECTIVE  PAST MEDICAL & SURGICAL HISTORY    Home Meds:    ALLERGIES:  Allergy Status Unknown    MEDICATIONS:  STANDING MEDICATIONS  atorvastatin 20 milliGRAM(s) Oral at bedtime  chlorhexidine 2% Cloths 1 Application(s) Topical <User Schedule>  dextrose 5%. 1000 milliLiter(s) IV Continuous <Continuous>  dextrose 5%. 1000 milliLiter(s) IV Continuous <Continuous>  dextrose 50% Injectable 25 Gram(s) IV Push once  dextrose 50% Injectable 12.5 Gram(s) IV Push once  dextrose 50% Injectable 25 Gram(s) IV Push once  enoxaparin Injectable 30 milliGRAM(s) SubCutaneous every 24 hours  glucagon  Injectable 1 milliGRAM(s) IntraMuscular once  influenza  Vaccine (HIGH DOSE) 0.7 milliLiter(s) IntraMuscular once  insulin lispro (ADMELOG) corrective regimen sliding scale   SubCutaneous Before meals and at bedtime  NIFEdipine XL 60 milliGRAM(s) Oral every 12 hours  pantoprazole    Tablet 40 milliGRAM(s) Oral before breakfast    PRN MEDICATIONS  acetaminophen     Tablet .. 650 milliGRAM(s) Oral every 6 hours PRN  dextrose Oral Gel 15 Gram(s) Oral once PRN      VITAL SIGNS: Last 24 Hours  T(C): 37 (2023 05:30), Max: 38.1 (2023 19:00)  T(F): 98.6 (2023 05:30), Max: 100.5 (2023 19:00)  HR: 84 (2023 07:00) (63 - 85)  BP: 139/63 (2023 07:00) (122/58 - 156/67)  BP(mean): 91 (2023 07:00) (83 - 98)  RR: 28 (2023 07:00) (13 - 32)  SpO2: 93% (2023 07:00) (92% - 100%)    LABS:                        8.8    8.53  )-----------( 125      ( 2023 05:10 )             26.8         138  |  105  |  42<H>  ----------------------------<  120<H>  4.2   |  26  |  1.99<H>    Ca    9.6      2023 05:10  Phos  2.9       Mg     1.8         TPro  6.8  /  Alb  2.9<L>  /  TBili  0.4  /  DBili  x   /  AST  14  /  ALT  9<L>  /  AlkPhos  53      PT/INR - ( 2023 05:30 )   PT: 12.8 sec;   INR: 1.07          PTT - ( 2023 05:30 )  PTT:27.4 sec  Urinalysis Basic - ( 2023 01:17 )    Color: Yellow / Appearance: Clear / S.020 / pH: x  Gluc: x / Ketone: NEGATIVE  / Bili: Negative / Urobili: 0.2 E.U./dL   Blood: x / Protein: NEGATIVE mg/dL / Nitrite: NEGATIVE   Leuk Esterase: Trace / RBC: < 5 /HPF / WBC < 5 /HPF   Sq Epi: x / Non Sq Epi: 0-5 /HPF / Bacteria: Present /HPF    Culture - Fungal, Body Fluid (collected 2023 10:02)  Source: .Body Fluid FUNGAL-PERICARDIAL FLUID CULTURE  Preliminary Report (2023 07:12):    Testing in progress    Culture - Body Fluid with Gram Stain (collected 2023 10:02)  Source: .Body Fluid PERICARDIAL FLUID CULTURE  Gram Stain (2023 16:28):    No organisms seen    Few WBC's  Preliminary Report (2023 08:01):    No growth to date    Culture - Blood (collected 2023 01:47)  Source: .Blood Blood  Preliminary Report (2023 03:00):    No growth at 1 day.    Culture - Blood (collected 2023 01:47)  Source: .Blood Blood  Preliminary Report (2023 03:00):    No growth at 1 day.      PHYSICAL EXAM:  General: +tired appearing, AAOx3  HEENT: atraumatic, normocephalic  Pulmonary: +decreased breath sounds in the LLL, RRL normal breath sounds, no wheezing, no increased WOB  Cardiovascular: Regular rate and rhythm; + minimal friction rub, no rubs or gallops. Normal S1S2  Gastrointestinal: Soft, nontender, nondistended; bowel sounds present  Musculoskeletal: 2+ peripheral pulses palpable, no clubbing, cyanosis +mild edema in the BL lower extremities - non-pitting   Neurology: Pt. alert and oriented, fluent speech, able to move all extremities  Skin: L Hand blisters on knuckle area  TU ADKINS 83y Female  MRN#: [id number]     SUBJECTIVE  Hospital day 2d (s/p pericardial drain) and this morning she reports decreased pain from her pericardial drain site. In addition the patient does not have any chest pain, SOB, headache, nausea, vomiting or diarrhea. Otherwise the patient does not complain of any pain in her extremities (particularly dull, prolonged pain), no dysuria, or increased urinary frequency.     Overnight, the patient was febrile to 100.4 F (Tmax 100.8 no repeat BCx obtained) w current Cx NGTD. Mg repleted. Ca 10.4, monitor for hypercalcemia. 110 cc mariela drain overnight.    OBJECTIVE  PAST MEDICAL & SURGICAL HISTORY    Home Meds:    ALLERGIES:  Allergy Status Unknown    MEDICATIONS:  STANDING MEDICATIONS  atorvastatin 20 milliGRAM(s) Oral at bedtime  chlorhexidine 2% Cloths 1 Application(s) Topical <User Schedule>  dextrose 5%. 1000 milliLiter(s) IV Continuous <Continuous>  dextrose 5%. 1000 milliLiter(s) IV Continuous <Continuous>  dextrose 50% Injectable 25 Gram(s) IV Push once  dextrose 50% Injectable 12.5 Gram(s) IV Push once  dextrose 50% Injectable 25 Gram(s) IV Push once  enoxaparin Injectable 30 milliGRAM(s) SubCutaneous every 24 hours  glucagon  Injectable 1 milliGRAM(s) IntraMuscular once  influenza  Vaccine (HIGH DOSE) 0.7 milliLiter(s) IntraMuscular once  insulin lispro (ADMELOG) corrective regimen sliding scale   SubCutaneous Before meals and at bedtime  NIFEdipine XL 60 milliGRAM(s) Oral every 12 hours  pantoprazole    Tablet 40 milliGRAM(s) Oral before breakfast    PRN MEDICATIONS  acetaminophen     Tablet .. 650 milliGRAM(s) Oral every 6 hours PRN  dextrose Oral Gel 15 Gram(s) Oral once PRN      VITAL SIGNS: Last 24 Hours  T(C): 37 (2023 05:30), Max: 38.1 (2023 19:00)  T(F): 98.6 (2023 05:30), Max: 100.5 (2023 19:00)  HR: 84 (2023 07:00) (63 - 85)  BP: 139/63 (2023 07:00) (122/58 - 156/67)  BP(mean): 91 (2023 07:00) (83 - 98)  RR: 28 (2023 07:00) (13 - 32)  SpO2: 93% (2023 07:00) (92% - 100%)    LABS:                        8.8    8.53  )-----------( 125      ( 2023 05:10 )             26.8     02-28    138  |  105  |  42<H>  ----------------------------<  120<H>  4.2   |  26  |  1.99<H>    Ca    9.6      2023 05:10  Phos  2.9       Mg     1.8         TPro  6.8  /  Alb  2.9<L>  /  TBili  0.4  /  DBili  x   /  AST  14  /  ALT  9<L>  /  AlkPhos  53      PT/INR - ( 2023 05:30 )   PT: 12.8 sec;   INR: 1.07          PTT - ( 2023 05:30 )  PTT:27.4 sec  Urinalysis Basic - ( 2023 01:17 )    Color: Yellow / Appearance: Clear / S.020 / pH: x  Gluc: x / Ketone: NEGATIVE  / Bili: Negative / Urobili: 0.2 E.U./dL   Blood: x / Protein: NEGATIVE mg/dL / Nitrite: NEGATIVE   Leuk Esterase: Trace / RBC: < 5 /HPF / WBC < 5 /HPF   Sq Epi: x / Non Sq Epi: 0-5 /HPF / Bacteria: Present /HPF    Culture - Fungal, Body Fluid (collected 2023 10:02)  Source: .Body Fluid FUNGAL-PERICARDIAL FLUID CULTURE  Preliminary Report (2023 07:12):    Testing in progress    Culture - Body Fluid with Gram Stain (collected 2023 10:02)  Source: .Body Fluid PERICARDIAL FLUID CULTURE  Gram Stain (2023 16:28):    No organisms seen    Few WBC's  Preliminary Report (2023 08:01):    No growth to date    Culture - Blood (collected 2023 01:47)  Source: .Blood Blood  Preliminary Report (2023 03:00):    No growth at 1 day.    Culture - Blood (collected 2023 01:47)  Source: .Blood Blood  Preliminary Report (2023 03:00):    No growth at 1 day.      PHYSICAL EXAM:  General: +tired appearing, AAOx3  HEENT: atraumatic, normocephalic  Pulmonary: +decreased breath sounds in the LLL, RRL normal breath sounds, no wheezing, no increased WOB  Cardiovascular: Regular rate and rhythm; + minimal friction rub, no rubs or gallops. Normal S1S2  Gastrointestinal: Soft, nontender, nondistended; bowel sounds present  Musculoskeletal: 2+ peripheral pulses palpable, no clubbing, cyanosis +mild edema in the BL lower extremities - non-pitting   Neurology: Pt. alert and oriented, fluent speech, able to move all extremities  Skin: L Hand blisters on knuckle area  TU ADKINS 83y Female  MRN#: [id number]     SUBJECTIVE  Hospital day 2d (s/p pericardial drain) and this morning she reports decreased pain from her pericardial drain site. In addition the patient does not have any chest pain, SOB, headache, nausea, vomiting or diarrhea. Otherwise the patient does not complain of any pain in her extremities (particularly dull, prolonged pain), no dysuria, or increased urinary frequency.     Overnight, the patient was febrile to 100.4 F (Tmax 100.8 no repeat BCx obtained) w current Cx NGTD. Mg repleted. Ca 10.4, monitor for hypercalcemia. 110 cc mariela drain overnight.    OBJECTIVE  PAST MEDICAL & SURGICAL HISTORY    Home Meds:    ALLERGIES:  Allergy Status Unknown    MEDICATIONS:  STANDING MEDICATIONS  atorvastatin 20 milliGRAM(s) Oral at bedtime  chlorhexidine 2% Cloths 1 Application(s) Topical <User Schedule>  dextrose 5%. 1000 milliLiter(s) IV Continuous <Continuous>  dextrose 5%. 1000 milliLiter(s) IV Continuous <Continuous>  dextrose 50% Injectable 25 Gram(s) IV Push once  dextrose 50% Injectable 12.5 Gram(s) IV Push once  dextrose 50% Injectable 25 Gram(s) IV Push once  enoxaparin Injectable 30 milliGRAM(s) SubCutaneous every 24 hours  glucagon  Injectable 1 milliGRAM(s) IntraMuscular once  influenza  Vaccine (HIGH DOSE) 0.7 milliLiter(s) IntraMuscular once  insulin lispro (ADMELOG) corrective regimen sliding scale   SubCutaneous Before meals and at bedtime  NIFEdipine XL 60 milliGRAM(s) Oral every 12 hours  pantoprazole    Tablet 40 milliGRAM(s) Oral before breakfast    PRN MEDICATIONS  acetaminophen     Tablet .. 650 milliGRAM(s) Oral every 6 hours PRN  dextrose Oral Gel 15 Gram(s) Oral once PRN      VITAL SIGNS: Last 24 Hours  T(C): 37 (2023 05:30), Max: 38.1 (2023 19:00)  T(F): 98.6 (2023 05:30), Max: 100.5 (2023 19:00)  HR: 84 (2023 07:00) (63 - 85)  BP: 139/63 (2023 07:00) (122/58 - 156/67)  BP(mean): 91 (2023 07:00) (83 - 98)  RR: 28 (2023 07:00) (13 - 32)  SpO2: 93% (2023 07:00) (92% - 100%)    LABS:                        8.8    8.53  )-----------( 125      ( 2023 05:10 )             26.8     02-28    138  |  105  |  42<H>  ----------------------------<  120<H>  4.2   |  26  |  1.99<H>    Ca    9.6      2023 05:10  Phos  2.9       Mg     1.8         TPro  6.8  /  Alb  2.9<L>  /  TBili  0.4  /  DBili  x   /  AST  14  /  ALT  9<L>  /  AlkPhos  53      PT/INR - ( 2023 05:30 )   PT: 12.8 sec;   INR: 1.07          PTT - ( 2023 05:30 )  PTT:27.4 sec  Urinalysis Basic - ( 2023 01:17 )    Color: Yellow / Appearance: Clear / S.020 / pH: x  Gluc: x / Ketone: NEGATIVE  / Bili: Negative / Urobili: 0.2 E.U./dL   Blood: x / Protein: NEGATIVE mg/dL / Nitrite: NEGATIVE   Leuk Esterase: Trace / RBC: < 5 /HPF / WBC < 5 /HPF   Sq Epi: x / Non Sq Epi: 0-5 /HPF / Bacteria: Present /HPF    Culture - Fungal, Body Fluid (collected 2023 10:02)  Source: .Body Fluid FUNGAL-PERICARDIAL FLUID CULTURE  Preliminary Report (2023 07:12):    Testing in progress    Culture - Body Fluid with Gram Stain (collected 2023 10:02)  Source: .Body Fluid PERICARDIAL FLUID CULTURE  Gram Stain (2023 16:28):    No organisms seen    Few WBC's  Preliminary Report (2023 08:01):    No growth to date    Culture - Blood (collected 2023 01:47)  Source: .Blood Blood  Preliminary Report (2023 03:00):    No growth at 1 day.    Culture - Blood (collected 2023 01:47)  Source: .Blood Blood  Preliminary Report (2023 03:00):    No growth at 1 day.      PHYSICAL EXAM:  General: +tired appearing, AAOx3  HEENT: atraumatic, normocephalic  Pulmonary: + mildly decreased breath sounds in the LLL (however after increased movement on bed), RRL normal breath sounds, no wheezing otherwise, no increased WOB noted on exam  Cardiovascular: Regular rate and rhythm; + minimal/quiet friction rub (improved from ), no rubs or gallops. Normal S1S2  Gastrointestinal: Soft, nontender, nondistended; bowel sounds present  Musculoskeletal: 2+ peripheral pulses palpable, no clubbing, cyanosis +mild edema in the BL lower extremities - non-pitting   Neurology: Pt. alert and oriented, fluent speech, able to move all extremities  Skin: L Hand blisters on knuckle area

## 2023-02-28 NOTE — PHYSICAL THERAPY INITIAL EVALUATION ADULT - PERTINENT HX OF CURRENT PROBLEM, REHAB EVAL
83F who presented to [facility] for weakness, near syncopal episode, decreased appetite, dizziness, warmth, and nausea, reporting fingerstick at home 49mg/dl, 39mg/dL, then 29 mg/dL, admitted to [facility] for hypoglycemia. Pt developed tachypnea in ED, PE showed BL crackles, bedside echo at [facility] showed pericardial effusion.

## 2023-02-28 NOTE — PROGRESS NOTE ADULT - NS ATTEND AMEND GEN_ALL_CORE FT
Agree with above. Pt still has residual drainage of serous fluid from pericardial drain albeit decreasing. Suspect it will be removed by tomorrow. If not able to remove will consult thoracic surg for possible window. no pericardial effusion on repeat bedside tte.

## 2023-02-28 NOTE — PHYSICAL THERAPY INITIAL EVALUATION ADULT - GENERAL OBSERVATIONS, REHAB EVAL
Received supine complaints of generalized body pain 10/10 +pericardial drain, EKG, IV hep. Left as found +Rn fabiana aware, SOB, Sp02 95% on RA, call bell

## 2023-02-28 NOTE — PROGRESS NOTE ADULT - SUBJECTIVE AND OBJECTIVE BOX
Patient discussed on morning rounds with Dr. Lee     OPERATION & DATE: 23 pericardiocentesis     SUBJECTIVE ASSESSMENT:  Assessed at bedside today. Some minor discomfort. No other acute complaints. Denies shortness of breath, fever, chills, nausea, vomiting.    VITAL SIGNS:  Vital Signs Last 24 Hrs  T(C): 37.2 (2023 17:11), Max: 37.6 (2023 01:30)  T(F): 98.9 (2023 17:11), Max: 99.7 (2023 01:30)  HR: 86 (2023 18:10) (63 - 91)  BP: 140/79 (2023 18:10) (122/58 - 151/65)  BP(mean): 99 (2023 18:10) (83 - 99)  RR: 23 (2023 18:10) (13 - 38)  SpO2: 95% (2023 18:10) (92% - 100%)    Parameters below as of 2023 17:20  Patient On (Oxygen Delivery Method): room air      I&O's Detail    2023 07:01  -  2023 07:00  --------------------------------------------------------  IN:    IV PiggyBack: 100 mL  Total IN: 100 mL    OUT:    Drain (mL): 295 mL    Voided (mL): 550 mL  Total OUT: 845 mL    Total NET: -745 mL      2023 07:01  -  2023 19:13  --------------------------------------------------------  IN:    sodium chloride 0.9%: 200 mL  Total IN: 200 mL    OUT:    Drain (mL): 80 mL    Voided (mL): 150 mL  Total OUT: 230 mL    Total NET: -30 mL    CHEST TUBE:  No  TIEN DRAIN:  No  EPICARDIAL WIRES: No   STITCHES: No  STAPLES: No  MORENO: No  CENTRAL LINE: No  MIDLINE/PICC: No  WOUND VAC: No  ++PERICARDIAL DRAIN    Physical Exam  CONSTITUTIONAL: Well appearing in NAD assessed laying comfortably in bed   NEURO: A&OX3. No focal deficits noted, moving bilateral upper and lower extremities                    CV: RRR, no murmurs, rubs, gallops  RESPIRATORY: Clear to auscultation bilateral posterior lung fields, no wheezes, rales, rhonchi   GI: +BS, NT/ND  MUSKULOSKELETAL: No peripheral edema or calf tenderness. Full strength and ROM bilateral upper and lower extremities   VASCULAR: Bilateral distal pulses 2+  INCISIONS: left pericardial drain in place, serous drainage     LABS:                        8.8    8.53  )-----------( 125      ( 2023 05:10 )             26.8     PT/INR - ( 2023 05:30 )   PT: 12.8 sec;   INR: 1.07          PTT - ( 2023 05:30 )  PTT:27.4 sec      138  |  105  |  42<H>  ----------------------------<  120<H>  4.2   |  26  |  1.99<H>    Ca    9.6      2023 05:10  Phos  2.9       Mg     1.8         TPro  6.8  /  Alb  2.9<L>  /  TBili  0.4  /  DBili  x   /  AST  14  /  ALT  9<L>  /  AlkPhos  53      Urinalysis Basic - ( 2023 01:17 )    Color: Yellow / Appearance: Clear / S.020 / pH: x  Gluc: x / Ketone: NEGATIVE  / Bili: Negative / Urobili: 0.2 E.U./dL   Blood: x / Protein: NEGATIVE mg/dL / Nitrite: NEGATIVE   Leuk Esterase: Trace / RBC: < 5 /HPF / WBC < 5 /HPF   Sq Epi: x / Non Sq Epi: 0-5 /HPF / Bacteria: Present /HPF      MEDICATIONS  (STANDING):  atorvastatin 20 milliGRAM(s) Oral at bedtime  chlorhexidine 2% Cloths 1 Application(s) Topical <User Schedule>  dextrose 5%. 1000 milliLiter(s) (100 mL/Hr) IV Continuous <Continuous>  dextrose 5%. 1000 milliLiter(s) (50 mL/Hr) IV Continuous <Continuous>  dextrose 50% Injectable 25 Gram(s) IV Push once  dextrose 50% Injectable 12.5 Gram(s) IV Push once  dextrose 50% Injectable 25 Gram(s) IV Push once  enoxaparin Injectable 30 milliGRAM(s) SubCutaneous every 24 hours  glucagon  Injectable 1 milliGRAM(s) IntraMuscular once  influenza  Vaccine (HIGH DOSE) 0.7 milliLiter(s) IntraMuscular once  insulin lispro (ADMELOG) corrective regimen sliding scale   SubCutaneous Before meals and at bedtime  NIFEdipine XL 60 milliGRAM(s) Oral every 12 hours  pantoprazole    Tablet 40 milliGRAM(s) Oral before breakfast  sodium chloride 0.9%. 1000 milliLiter(s) (100 mL/Hr) IV Continuous <Continuous>    MEDICATIONS  (PRN):  acetaminophen     Tablet .. 650 milliGRAM(s) Oral every 6 hours PRN Temp greater or equal to 38C (100.4F), Mild Pain (1 - 3), Moderate Pain (4 - 6)  dextrose Oral Gel 15 Gram(s) Oral once PRN Blood Glucose LESS THAN 70 milliGRAM(s)/deciliter    RADIOLOGY & ADDITIONAL TESTS:    < from: TTE Limited Echo w/o Cont (23 @ 10:16) >  CONCLUSIONS:     1. Limited study obtained for evaluation of pericardial effusion.   2. Normal left ventricular systolic function.   3. No pericardial effusion.    < end of copied text >

## 2023-02-28 NOTE — PROCEDURE NOTE - NSICDXPROCEDURE_GEN_ALL_CORE_FT
PROCEDURES:  Insertion of intravenous catheter with ultrasound guidance 28-Feb-2023 15:41:32  Kwame Ceron  Insertion, needle, vein 28-Feb-2023 15:41:36  Kwame Ceron

## 2023-02-28 NOTE — PROGRESS NOTE ADULT - ASSESSMENT
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 Admitted to [facility] for hypoglycemia. Patient developed tachypnea in ED, noted to have crackles on exam, bedside echo at [facility] showed large pericardial effusion. Per daughter at bedside, patient last had an episode of pericardial effusion in 07/2021 in [place] and underwent pericardiocentesis with an unknown etiology. Of note, pt was recently hospitalized on 2/13 at [facility] for SOB, LE edema, her home lasix 40mg was increased. Transferred to [facility] for further management of pericardial effusion.  Structural heart consulted for evaluation of pericardial effusion and deemed a good candidate. On 2/26/23 pt underwent pericardiocentesis with Dr. Lee.    Plan:   Problem 1: recurrent pericardial effusion  -s/p pericardiocentesis with Dr. Lee on 2/26/23   -drain remains in place, put out 110cc overnight and 80 cc today.  - Continue to monitor drain output, may consider removal tomorrow   -structural heart will continue to follow patient.    Care plan discussed with  and primary team. Structural heart continuing to follow.

## 2023-03-01 DIAGNOSIS — I50.23 ACUTE ON CHRONIC SYSTOLIC (CONGESTIVE) HEART FAILURE: ICD-10-CM

## 2023-03-01 DIAGNOSIS — D64.9 ANEMIA, UNSPECIFIED: ICD-10-CM

## 2023-03-01 DIAGNOSIS — I73.9 PERIPHERAL VASCULAR DISEASE, UNSPECIFIED: ICD-10-CM

## 2023-03-01 DIAGNOSIS — Z29.9 ENCOUNTER FOR PROPHYLACTIC MEASURES, UNSPECIFIED: ICD-10-CM

## 2023-03-01 DIAGNOSIS — G47.33 OBSTRUCTIVE SLEEP APNEA (ADULT) (PEDIATRIC): ICD-10-CM

## 2023-03-01 DIAGNOSIS — I10 ESSENTIAL (PRIMARY) HYPERTENSION: ICD-10-CM

## 2023-03-01 DIAGNOSIS — N18.30 CHRONIC KIDNEY DISEASE, STAGE 3 UNSPECIFIED: ICD-10-CM

## 2023-03-01 DIAGNOSIS — I31.39 OTHER PERICARDIAL EFFUSION (NONINFLAMMATORY): ICD-10-CM

## 2023-03-01 DIAGNOSIS — E11.9 TYPE 2 DIABETES MELLITUS WITHOUT COMPLICATIONS: ICD-10-CM

## 2023-03-01 DIAGNOSIS — K21.9 GASTRO-ESOPHAGEAL REFLUX DISEASE WITHOUT ESOPHAGITIS: ICD-10-CM

## 2023-03-01 LAB
ALBUMIN SERPL ELPH-MCNC: 2.8 G/DL — LOW (ref 3.3–5)
ALP SERPL-CCNC: 52 U/L — SIGNIFICANT CHANGE UP (ref 40–120)
ALT FLD-CCNC: 10 U/L — SIGNIFICANT CHANGE UP (ref 10–45)
ANION GAP SERPL CALC-SCNC: 8 MMOL/L — SIGNIFICANT CHANGE UP (ref 5–17)
AST SERPL-CCNC: 16 U/L — SIGNIFICANT CHANGE UP (ref 10–40)
BILIRUB SERPL-MCNC: 0.4 MG/DL — SIGNIFICANT CHANGE UP (ref 0.2–1.2)
BUN SERPL-MCNC: 45 MG/DL — HIGH (ref 7–23)
CALCIUM SERPL-MCNC: 9.6 MG/DL — SIGNIFICANT CHANGE UP (ref 8.4–10.5)
CHLORIDE SERPL-SCNC: 105 MMOL/L — SIGNIFICANT CHANGE UP (ref 96–108)
CO2 SERPL-SCNC: 24 MMOL/L — SIGNIFICANT CHANGE UP (ref 22–31)
CREAT SERPL-MCNC: 2.19 MG/DL — HIGH (ref 0.5–1.3)
EGFR: 22 ML/MIN/1.73M2 — LOW
FERRITIN SERPL-MCNC: 338 NG/ML — HIGH (ref 15–150)
GLUCOSE BLDC GLUCOMTR-MCNC: 127 MG/DL — HIGH (ref 70–99)
GLUCOSE BLDC GLUCOMTR-MCNC: 188 MG/DL — HIGH (ref 70–99)
GLUCOSE BLDC GLUCOMTR-MCNC: 225 MG/DL — HIGH (ref 70–99)
GLUCOSE BLDC GLUCOMTR-MCNC: 96 MG/DL — SIGNIFICANT CHANGE UP (ref 70–99)
GLUCOSE SERPL-MCNC: 139 MG/DL — HIGH (ref 70–99)
HCT VFR BLD CALC: 26.3 % — LOW (ref 34.5–45)
HGB BLD-MCNC: 8.5 G/DL — LOW (ref 11.5–15.5)
IGA FLD-MCNC: 521 MG/DL — HIGH (ref 84–499)
IGA FLD-MCNC: 621 MG/DL — HIGH (ref 84–499)
IGG FLD-MCNC: 1232 MG/DL — SIGNIFICANT CHANGE UP (ref 610–1660)
IGG FLD-MCNC: 970 MG/DL — SIGNIFICANT CHANGE UP (ref 610–1660)
IGM SERPL-MCNC: 41 MG/DL — SIGNIFICANT CHANGE UP (ref 35–242)
IGM SERPL-MCNC: 46 MG/DL — SIGNIFICANT CHANGE UP (ref 35–242)
IRON SATN MFR SERPL: 15 % — SIGNIFICANT CHANGE UP (ref 14–50)
IRON SATN MFR SERPL: 29 UG/DL — LOW (ref 30–160)
KAPPA LC SER QL IFE: 10.34 MG/DL — HIGH (ref 0.33–1.94)
KAPPA LC SER QL IFE: 10.56 MG/DL — HIGH (ref 0.33–1.94)
KAPPA/LAMBDA FREE LIGHT CHAIN RATIO, SERUM: 1.26 RATIO — SIGNIFICANT CHANGE UP (ref 0.26–1.65)
KAPPA/LAMBDA FREE LIGHT CHAIN RATIO, SERUM: 1.36 RATIO — SIGNIFICANT CHANGE UP (ref 0.26–1.65)
LAMBDA LC SER QL IFE: 7.58 MG/DL — HIGH (ref 0.57–2.63)
LAMBDA LC SER QL IFE: 8.37 MG/DL — HIGH (ref 0.57–2.63)
MAGNESIUM SERPL-MCNC: 2.2 MG/DL — SIGNIFICANT CHANGE UP (ref 1.6–2.6)
MCHC RBC-ENTMCNC: 30.1 PG — SIGNIFICANT CHANGE UP (ref 27–34)
MCHC RBC-ENTMCNC: 32.3 GM/DL — SIGNIFICANT CHANGE UP (ref 32–36)
MCV RBC AUTO: 93.3 FL — SIGNIFICANT CHANGE UP (ref 80–100)
NRBC # BLD: 0 /100 WBCS — SIGNIFICANT CHANGE UP (ref 0–0)
PHOSPHATE SERPL-MCNC: 3.8 MG/DL — SIGNIFICANT CHANGE UP (ref 2.5–4.5)
PLATELET # BLD AUTO: 133 K/UL — LOW (ref 150–400)
POTASSIUM SERPL-MCNC: 4.5 MMOL/L — SIGNIFICANT CHANGE UP (ref 3.5–5.3)
POTASSIUM SERPL-SCNC: 4.5 MMOL/L — SIGNIFICANT CHANGE UP (ref 3.5–5.3)
PROT SERPL-MCNC: 6.1 G/DL — SIGNIFICANT CHANGE UP (ref 6–8.3)
PROT SERPL-MCNC: 6.1 G/DL — SIGNIFICANT CHANGE UP (ref 6–8.3)
PROT SERPL-MCNC: 6.6 G/DL — SIGNIFICANT CHANGE UP (ref 6–8.3)
PROT SERPL-MCNC: 6.6 G/DL — SIGNIFICANT CHANGE UP (ref 6–8.3)
PROT SERPL-MCNC: 6.8 G/DL — SIGNIFICANT CHANGE UP (ref 6–8.3)
RBC # BLD: 2.82 M/UL — LOW (ref 3.8–5.2)
RBC # FLD: 16 % — HIGH (ref 10.3–14.5)
SODIUM SERPL-SCNC: 137 MMOL/L — SIGNIFICANT CHANGE UP (ref 135–145)
TIBC SERPL-MCNC: 192 UG/DL — LOW (ref 220–430)
TRANSFERRIN SERPL-MCNC: 138 MG/DL — LOW (ref 200–360)
UIBC SERPL-MCNC: 163 UG/DL — SIGNIFICANT CHANGE UP (ref 110–370)
WBC # BLD: 7.61 K/UL — SIGNIFICANT CHANGE UP (ref 3.8–10.5)
WBC # FLD AUTO: 7.61 K/UL — SIGNIFICANT CHANGE UP (ref 3.8–10.5)

## 2023-03-01 PROCEDURE — 99291 CRITICAL CARE FIRST HOUR: CPT

## 2023-03-01 PROCEDURE — 93308 TTE F-UP OR LMTD: CPT | Mod: 26

## 2023-03-01 PROCEDURE — 71045 X-RAY EXAM CHEST 1 VIEW: CPT | Mod: 26

## 2023-03-01 RX ORDER — SODIUM CHLORIDE 9 MG/ML
1000 INJECTION, SOLUTION INTRAVENOUS
Refills: 0 | Status: DISCONTINUED | OUTPATIENT
Start: 2023-03-01 | End: 2023-03-01

## 2023-03-01 RX ORDER — BACITRACIN AND POLYMYXIN B SULFATE 500; 10000 [USP'U]/G; [USP'U]/G
1 OINTMENT TOPICAL DAILY
Refills: 0 | Status: DISCONTINUED | OUTPATIENT
Start: 2023-03-01 | End: 2023-03-07

## 2023-03-01 RX ADMIN — Medication 650 MILLIGRAM(S): at 19:15

## 2023-03-01 RX ADMIN — Medication 650 MILLIGRAM(S): at 19:46

## 2023-03-01 RX ADMIN — BACITRACIN AND POLYMYXIN B SULFATE 1 APPLICATION(S): 500; 10000 OINTMENT TOPICAL at 14:02

## 2023-03-01 RX ADMIN — SODIUM CHLORIDE 250 MILLILITER(S): 9 INJECTION, SOLUTION INTRAVENOUS at 12:41

## 2023-03-01 RX ADMIN — Medication 650 MILLIGRAM(S): at 09:49

## 2023-03-01 RX ADMIN — Medication 650 MILLIGRAM(S): at 10:15

## 2023-03-01 RX ADMIN — Medication 2: at 22:14

## 2023-03-01 RX ADMIN — Medication 60 MILLIGRAM(S): at 07:04

## 2023-03-01 RX ADMIN — Medication 4: at 11:59

## 2023-03-01 RX ADMIN — Medication 60 MILLIGRAM(S): at 19:36

## 2023-03-01 RX ADMIN — PANTOPRAZOLE SODIUM 40 MILLIGRAM(S): 20 TABLET, DELAYED RELEASE ORAL at 07:04

## 2023-03-01 RX ADMIN — CHLORHEXIDINE GLUCONATE 1 APPLICATION(S): 213 SOLUTION TOPICAL at 07:05

## 2023-03-01 RX ADMIN — ATORVASTATIN CALCIUM 20 MILLIGRAM(S): 80 TABLET, FILM COATED ORAL at 22:13

## 2023-03-01 RX ADMIN — ENOXAPARIN SODIUM 30 MILLIGRAM(S): 100 INJECTION SUBCUTANEOUS at 11:48

## 2023-03-01 NOTE — DISCHARGE NOTE PROVIDER - NSDCMRMEDTOKEN_GEN_ALL_CORE_FT
allopurinol 100 mg oral tablet: 1 tab(s) orally once a day  aspirin 81 mg oral tablet: 1 tab(s) orally once a day  atorvastatin 20 mg oral tablet: 1 tab(s) orally once a day  carvedilol 12.5 mg oral tablet: 1 tab(s) orally 2 times a day  furosemide 40 mg oral tablet: 1 tab(s) orally once a day  glipiZIDE 10 mg oral tablet: 1 tab(s) orally once a day  hydrALAZINE 50 mg oral tablet: 1 tab(s) orally every 8 hours  Januvia 50 mg oral tablet: 1 tab(s) orally once a day  NIFEdipine 60 mg oral tablet, extended release: 1 tab(s) orally every 12 hours  pantoprazole 40 mg oral delayed release tablet: 1 tab(s) orally once a day   allopurinol 100 mg oral tablet: 1 tab(s) orally once a day  aspirin 81 mg oral tablet: 1 tab(s) orally once a day  atorvastatin 20 mg oral tablet: 1 tab(s) orally once a day  carvedilol 12.5 mg oral tablet: 1 tab(s) orally 2 times a day  furosemide 40 mg oral tablet: 1 tab(s) orally once a day  glipiZIDE 10 mg oral tablet: 1 tab(s) orally once a day  hydrALAZINE 25 mg oral tablet: 3 tab(s) orally every 8 hours  Januvia 50 mg oral tablet: 1 tab(s) orally once a day  NIFEdipine 60 mg oral tablet, extended release: 1 tab(s) orally every 12 hours  pantoprazole 40 mg oral delayed release tablet: 1 tab(s) orally once a day   allopurinol 100 mg oral tablet: 1 tab(s) orally once a day  aspirin 81 mg oral tablet: 1 tab(s) orally once a day  atorvastatin 20 mg oral tablet: 1 tab(s) orally once a day  carvedilol 3.125 mg oral tablet: 1 tab(s) orally 2 times a day  furosemide 40 mg oral tablet: 1 tab(s) orally once a day  glipiZIDE 10 mg oral tablet: 1 tab(s) orally once a day  hydrALAZINE 25 mg oral tablet: 3 tab(s) orally every 8 hours  Januvia 50 mg oral tablet: 1 tab(s) orally once a day  NIFEdipine 60 mg oral tablet, extended release: 1 tab(s) orally every 12 hours  pantoprazole 40 mg oral delayed release tablet: 1 tab(s) orally once a day   allopurinol 100 mg oral tablet: 1 tab(s) orally once a day  aspirin 81 mg oral tablet: 1 tab(s) orally once a day  atorvastatin 20 mg oral tablet: 1 tab(s) orally once a day  furosemide 40 mg oral tablet: 1 tab(s) orally once a day  glipiZIDE 10 mg oral tablet: 1 tab(s) orally once a day  hydrALAZINE 25 mg oral tablet: 3 tab(s) orally every 8 hours  Januvia 50 mg oral tablet: 1 tab(s) orally once a day  NIFEdipine 60 mg oral tablet, extended release: 1 tab(s) orally every 12 hours  pantoprazole 40 mg oral delayed release tablet: 1 tab(s) orally once a day

## 2023-03-01 NOTE — PROGRESS NOTE ADULT - SUBJECTIVE AND OBJECTIVE BOX
*incomplete* TU ADKINS 83y Female  MRN#: [id number]     SUBJECTIVE  This morning the patient reports continued use of incentive spirometer (however only 1-2x per day) w limited PO intake, however improvement from 2/28. The patient is open to OOBTC today (had OOBTC on 2/28). The patient does report mild pain, however improved, from the pericardial drain, particular when increased movement. The patient denies any SOB, lightheadedness or headache. Overnight, the patient had 9cc drainage from drain w 89cc drainage over last 24 hours.      OBJECTIVE  PAST MEDICAL & SURGICAL HISTORY    Home Meds:    ALLERGIES:  Allergy Status Unknown    MEDICATIONS:  STANDING MEDICATIONS  atorvastatin 20 milliGRAM(s) Oral at bedtime  chlorhexidine 2% Cloths 1 Application(s) Topical <User Schedule>  dextrose 5%. 1000 milliLiter(s) IV Continuous <Continuous>  dextrose 5%. 1000 milliLiter(s) IV Continuous <Continuous>  dextrose 50% Injectable 25 Gram(s) IV Push once  dextrose 50% Injectable 12.5 Gram(s) IV Push once  dextrose 50% Injectable 25 Gram(s) IV Push once  enoxaparin Injectable 30 milliGRAM(s) SubCutaneous every 24 hours  glucagon  Injectable 1 milliGRAM(s) IntraMuscular once  influenza  Vaccine (HIGH DOSE) 0.7 milliLiter(s) IntraMuscular once  insulin lispro (ADMELOG) corrective regimen sliding scale   SubCutaneous Before meals and at bedtime  NIFEdipine XL 60 milliGRAM(s) Oral every 12 hours  pantoprazole    Tablet 40 milliGRAM(s) Oral before breakfast    PRN MEDICATIONS  acetaminophen     Tablet .. 650 milliGRAM(s) Oral every 6 hours PRN  dextrose Oral Gel 15 Gram(s) Oral once PRN      VITAL SIGNS: Last 24 Hours  T(C): 36.7 (01 Mar 2023 06:30), Max: 37.5 (28 Feb 2023 09:00)  T(F): 98 (01 Mar 2023 06:30), Max: 99.5 (28 Feb 2023 09:00)  HR: 83 (01 Mar 2023 08:00) (60 - 91)  BP: 124/71 (01 Mar 2023 08:00) (108/57 - 151/64)  BP(mean): 87 (01 Mar 2023 08:00) (75 - 99)  RR: 28 (01 Mar 2023 08:00) (11 - 38)  SpO2: 97% (01 Mar 2023 08:00) (92% - 100%)    LABS:                        8.5    7.61  )-----------( 133      ( 01 Mar 2023 05:30 )             26.3     03-01    137  |  105  |  45<H>  ----------------------------<  139<H>  4.5   |  24  |  2.19<H>    Ca    9.6      01 Mar 2023 05:30  Phos  3.8     03-01  Mg     2.2     03-01    TPro  6.8  /  Alb  2.8<L>  /  TBili  0.4  /  DBili  x   /  AST  16  /  ALT  10  /  AlkPhos  52  03-01              Culture - Fungal, Body Fluid (collected 27 Feb 2023 10:02)  Source: .Body Fluid FUNGAL-PERICARDIAL FLUID CULTURE  Preliminary Report (28 Feb 2023 07:12):    Testing in progress    Culture - Body Fluid with Gram Stain (collected 27 Feb 2023 10:02)  Source: .Body Fluid PERICARDIAL FLUID CULTURE  Gram Stain (27 Feb 2023 16:28):    No organisms seen    Few WBC's  Preliminary Report (28 Feb 2023 08:01):    No growth to date    Culture - Blood (collected 27 Feb 2023 01:47)  Source: .Blood Blood  Preliminary Report (01 Mar 2023 03:00):    No growth at 2 days.    Culture - Blood (collected 27 Feb 2023 01:47)  Source: .Blood Blood  Preliminary Report (01 Mar 2023 03:00):    No growth at 2 days.    Culture - Urine (collected 27 Feb 2023 01:16)  Source: Clean Catch Clean Catch (Midstream)  Final Report (28 Feb 2023 09:10):    Specimen appears CONTAMINATED. Lab suggests repeat clean catch specimen.    PHYSICAL EXAM:  General: +tired appearing, AAOx3  HEENT: atraumatic, normocephalic  Pulmonary: CTAB, no wheezing, no increased WOB on RA w BIPAP o/n  Cardiovascular: Regular rate and rhythm, no friction rub or gallops. Normal S1S2  Gastrointestinal: Soft, nontender, nondistended; bowel sounds present  Musculoskeletal: 2+ peripheral pulses palpable, no clubbing or cyanosis, No edema noted on the LE bilaterally   Neurology: Pt. alert and oriented, fluent speech, able to move all extremities  Skin: L Hand blisters on knuckle area (similar from 2/28) TU ADKINS 83y Female  MRN#: [id number]     SUBJECTIVE  This morning the patient reports continued use of incentive spirometer (however only 1-2x per day) w limited PO intake, however improvement from 2/28. The patient is open to OOBTC today (had OOBTC on 2/28). The patient does report mild pain, however improved, from the pericardial drain, particular when increased movement. The patient denies any SOB, lightheadedness or headache. Overnight, the patient had 9cc drainage from drain w 89cc drainage over last 24 hours.      OBJECTIVE  PAST MEDICAL & SURGICAL HISTORY    Home Meds:    ALLERGIES:  Allergy Status Unknown    MEDICATIONS:  STANDING MEDICATIONS  atorvastatin 20 milliGRAM(s) Oral at bedtime  chlorhexidine 2% Cloths 1 Application(s) Topical <User Schedule>  dextrose 5%. 1000 milliLiter(s) IV Continuous <Continuous>  dextrose 5%. 1000 milliLiter(s) IV Continuous <Continuous>  dextrose 50% Injectable 25 Gram(s) IV Push once  dextrose 50% Injectable 12.5 Gram(s) IV Push once  dextrose 50% Injectable 25 Gram(s) IV Push once  enoxaparin Injectable 30 milliGRAM(s) SubCutaneous every 24 hours  glucagon  Injectable 1 milliGRAM(s) IntraMuscular once  influenza  Vaccine (HIGH DOSE) 0.7 milliLiter(s) IntraMuscular once  insulin lispro (ADMELOG) corrective regimen sliding scale   SubCutaneous Before meals and at bedtime  NIFEdipine XL 60 milliGRAM(s) Oral every 12 hours  pantoprazole    Tablet 40 milliGRAM(s) Oral before breakfast    PRN MEDICATIONS  acetaminophen     Tablet .. 650 milliGRAM(s) Oral every 6 hours PRN  dextrose Oral Gel 15 Gram(s) Oral once PRN      VITAL SIGNS: Last 24 Hours  T(C): 36.7 (01 Mar 2023 06:30), Max: 37.5 (28 Feb 2023 09:00)  T(F): 98 (01 Mar 2023 06:30), Max: 99.5 (28 Feb 2023 09:00)  HR: 83 (01 Mar 2023 08:00) (60 - 91)  BP: 124/71 (01 Mar 2023 08:00) (108/57 - 151/64)  BP(mean): 87 (01 Mar 2023 08:00) (75 - 99)  RR: 28 (01 Mar 2023 08:00) (11 - 38)  SpO2: 97% (01 Mar 2023 08:00) (92% - 100%)    LABS:                        8.5    7.61  )-----------( 133      ( 01 Mar 2023 05:30 )             26.3     03-01    137  |  105  |  45<H>  ----------------------------<  139<H>  4.5   |  24  |  2.19<H>    Ca    9.6      01 Mar 2023 05:30  Phos  3.8     03-01  Mg     2.2     03-01    TPro  6.8  /  Alb  2.8<L>  /  TBili  0.4  /  DBili  x   /  AST  16  /  ALT  10  /  AlkPhos  52  03-01      Culture - Fungal, Body Fluid (collected 27 Feb 2023 10:02)  Source: .Body Fluid FUNGAL-PERICARDIAL FLUID CULTURE  Preliminary Report (28 Feb 2023 07:12):    Testing in progress    Culture - Body Fluid with Gram Stain (collected 27 Feb 2023 10:02)  Source: .Body Fluid PERICARDIAL FLUID CULTURE  Gram Stain (27 Feb 2023 16:28):    No organisms seen    Few WBC's  Preliminary Report (28 Feb 2023 08:01):    No growth to date    Culture - Blood (collected 27 Feb 2023 01:47)  Source: .Blood Blood  Preliminary Report (01 Mar 2023 03:00):    No growth at 2 days.    Culture - Blood (collected 27 Feb 2023 01:47)  Source: .Blood Blood  Preliminary Report (01 Mar 2023 03:00):    No growth at 2 days.    Culture - Urine (collected 27 Feb 2023 01:16)  Source: Clean Catch Clean Catch (Midstream)  Final Report (28 Feb 2023 09:10):    Specimen appears CONTAMINATED. Lab suggests repeat clean catch specimen.    PHYSICAL EXAM:  General: +tired appearing, AAOx3  HEENT: atraumatic, normocephalic  Pulmonary: CTAB, no wheezing, no increased WOB on RA w BIPAP o/n  Cardiovascular: Regular rate and rhythm, no friction rub or gallops. Normal S1S2  Gastrointestinal: Soft, nontender, nondistended; bowel sounds present  Musculoskeletal: 2+ peripheral pulses palpable, no clubbing or cyanosis, No edema noted on the LE bilaterally   Neurology: Pt. 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 Pt developed tachypnea in ED w PE showing BL crackles. Bedside echo at [place] showed a large pericardial effusion w/ echocardiographic signs of tamponade, now s/p pericardiocentesis w/ ~850cc of serous fluid drained. Drain kept in place w reducing drainage daily. Repeat TTE w trivial effusion w pericardial fluid analysis showing Plasma cells (w anemia, borderline Ca levels) promoting MM workup w sending of SPEP (pending results). Of note the patient had an episode of pericardial effusion in 07/2021 in [place] and underwent pericardiocentesis with an unknown etiology. EP consulted for PPM evaluation (no acute interventions) for EKG: NSR w/ 1st deg AVB and intermittent LBBB, however discontinued home Coreg and AL int has improved from 300 to 120s. Patient continues to be admitted w decreasing pericardial drain output and continued encouragement PO intake + OOBTC w PT recommending YAMIL.    SUBJECTIVE  This morning the patient reports continued use of incentive spirometer (however only 1-2x per day) w limited PO intake, however improvement from 2/28. The patient is open to OOBTC today (had OOBTC on 2/28). The patient does report mild pain, however improved, from the pericardial drain, particular when increased movement. The patient denies any SOB, lightheadedness or headache. Overnight, the patient had 9cc drainage from drain w 89cc drainage over last 24 hours.      OBJECTIVE  PAST MEDICAL & SURGICAL HISTORY    Home Meds:    ALLERGIES:  Allergy Status Unknown    MEDICATIONS:  STANDING MEDICATIONS  atorvastatin 20 milliGRAM(s) Oral at bedtime  chlorhexidine 2% Cloths 1 Application(s) Topical <User Schedule>  dextrose 5%. 1000 milliLiter(s) IV Continuous <Continuous>  dextrose 5%. 1000 milliLiter(s) IV Continuous <Continuous>  dextrose 50% Injectable 25 Gram(s) IV Push once  dextrose 50% Injectable 12.5 Gram(s) IV Push once  dextrose 50% Injectable 25 Gram(s) IV Push once  enoxaparin Injectable 30 milliGRAM(s) SubCutaneous every 24 hours  glucagon  Injectable 1 milliGRAM(s) IntraMuscular once  influenza  Vaccine (HIGH DOSE) 0.7 milliLiter(s) IntraMuscular once  insulin lispro (ADMELOG) corrective regimen sliding scale   SubCutaneous Before meals and at bedtime  NIFEdipine XL 60 milliGRAM(s) Oral every 12 hours  pantoprazole    Tablet 40 milliGRAM(s) Oral before breakfast    PRN MEDICATIONS  acetaminophen     Tablet .. 650 milliGRAM(s) Oral every 6 hours PRN  dextrose Oral Gel 15 Gram(s) Oral once PRN      VITAL SIGNS: Last 24 Hours  T(C): 36.7 (01 Mar 2023 06:30), Max: 37.5 (28 Feb 2023 09:00)  T(F): 98 (01 Mar 2023 06:30), Max: 99.5 (28 Feb 2023 09:00)  HR: 83 (01 Mar 2023 08:00) (60 - 91)  BP: 124/71 (01 Mar 2023 08:00) (108/57 - 151/64)  BP(mean): 87 (01 Mar 2023 08:00) (75 - 99)  RR: 28 (01 Mar 2023 08:00) (11 - 38)  SpO2: 97% (01 Mar 2023 08:00) (92% - 100%)    LABS:                        8.5    7.61  )-----------( 133      ( 01 Mar 2023 05:30 )             26.3     03-01    137  |  105  |  45<H>  ----------------------------<  139<H>  4.5   |  24  |  2.19<H>    Ca    9.6      01 Mar 2023 05:30  Phos  3.8     03-01  Mg     2.2     03-01    TPro  6.8  /  Alb  2.8<L>  /  TBili  0.4  /  DBili  x   /  AST  16  /  ALT  10  /  AlkPhos  52  03-01      Culture - Fungal, Body Fluid (collected 27 Feb 2023 10:02)  Source: .Body Fluid FUNGAL-PERICARDIAL FLUID CULTURE  Preliminary Report (28 Feb 2023 07:12):    Testing in progress    Culture - Body Fluid with Gram Stain (collected 27 Feb 2023 10:02)  Source: .Body Fluid PERICARDIAL FLUID CULTURE  Gram Stain (27 Feb 2023 16:28):    No organisms seen    Few WBC's  Preliminary Report (28 Feb 2023 08:01):    No growth to date    Culture - Blood (collected 27 Feb 2023 01:47)  Source: .Blood Blood  Preliminary Report (01 Mar 2023 03:00):    No growth at 2 days.    Culture - Blood (collected 27 Feb 2023 01:47)  Source: .Blood Blood  Preliminary Report (01 Mar 2023 03:00):    No growth at 2 days.    Culture - Urine (collected 27 Feb 2023 01:16)  Source: Clean Catch Clean Catch (Midstream)  Final Report (28 Feb 2023 09:10):    Specimen appears CONTAMINATED. Lab suggests repeat clean catch specimen.    PHYSICAL EXAM:  General: +tired appearing, AAOx3  HEENT: atraumatic, normocephalic  Pulmonary: CTAB, no wheezing, no increased WOB on RA w BIPAP o/n  Cardiovascular: Regular rate and rhythm, no friction rub or gallops. Normal S1S2  Gastrointestinal: Soft, nontender, nondistended; bowel sounds present  Musculoskeletal: 2+ peripheral pulses palpable, no clubbing or cyanosis, No edema noted on the LE bilaterally   Neurology: Pt. 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 Pt developed tachypnea in ED w PE showing BL crackles. Bedside echo at [place] showed a large pericardial effusion w/ echocardiographic signs of tamponade, now s/p pericardiocentesis w/ ~850cc of serous fluid drained. Drain kept in place w reducing drainage daily. Repeat TTE w trivial effusion w pericardial fluid analysis showing Plasma cells (w anemia, borderline Ca levels) promoting MM workup w sending of SPEP (pending results). Of note the patient had an episode of pericardial effusion in 07/2021 in [place] and underwent pericardiocentesis with an unknown etiology. 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 Pericardial drain was removed on 3/1 with evidence of skin irritation/wound formation; applied bacitracin ointment to the affected area. PT evaluation taken on 2/28 with recommendation for YAMIL. Pt was stable for stepdown on 3/1 awaiting YAMIL placement.     SUBJECTIVE  This morning the patient reports continued use of incentive spirometer (however only 1-2x per day) w limited PO intake, however improvement from 2/28. The patient is open to OOBTC today (had OOBTC on 2/28). The patient does report mild pain, however improved, from the pericardial drain, particular when increased movement. The patient denies any SOB, lightheadedness or headache. Overnight, the patient had 9cc drainage from drain w 89cc drainage over last 24 hours.      OBJECTIVE  PAST MEDICAL & SURGICAL HISTORY    Home Meds:    ALLERGIES:  Allergy Status Unknown    MEDICATIONS:  STANDING MEDICATIONS  atorvastatin 20 milliGRAM(s) Oral at bedtime  chlorhexidine 2% Cloths 1 Application(s) Topical <User Schedule>  dextrose 5%. 1000 milliLiter(s) IV Continuous <Continuous>  dextrose 5%. 1000 milliLiter(s) IV Continuous <Continuous>  dextrose 50% Injectable 25 Gram(s) IV Push once  dextrose 50% Injectable 12.5 Gram(s) IV Push once  dextrose 50% Injectable 25 Gram(s) IV Push once  enoxaparin Injectable 30 milliGRAM(s) SubCutaneous every 24 hours  glucagon  Injectable 1 milliGRAM(s) IntraMuscular once  influenza  Vaccine (HIGH DOSE) 0.7 milliLiter(s) IntraMuscular once  insulin lispro (ADMELOG) corrective regimen sliding scale   SubCutaneous Before meals and at bedtime  NIFEdipine XL 60 milliGRAM(s) Oral every 12 hours  pantoprazole    Tablet 40 milliGRAM(s) Oral before breakfast    PRN MEDICATIONS  acetaminophen     Tablet .. 650 milliGRAM(s) Oral every 6 hours PRN  dextrose Oral Gel 15 Gram(s) Oral once PRN      VITAL SIGNS: Last 24 Hours  T(C): 36.7 (01 Mar 2023 06:30), Max: 37.5 (28 Feb 2023 09:00)  T(F): 98 (01 Mar 2023 06:30), Max: 99.5 (28 Feb 2023 09:00)  HR: 83 (01 Mar 2023 08:00) (60 - 91)  BP: 124/71 (01 Mar 2023 08:00) (108/57 - 151/64)  BP(mean): 87 (01 Mar 2023 08:00) (75 - 99)  RR: 28 (01 Mar 2023 08:00) (11 - 38)  SpO2: 97% (01 Mar 2023 08:00) (92% - 100%)    LABS:                        8.5    7.61  )-----------( 133      ( 01 Mar 2023 05:30 )             26.3     03-01    137  |  105  |  45<H>  ----------------------------<  139<H>  4.5   |  24  |  2.19<H>    Ca    9.6      01 Mar 2023 05:30  Phos  3.8     03-01  Mg     2.2     03-01    TPro  6.8  /  Alb  2.8<L>  /  TBili  0.4  /  DBili  x   /  AST  16  /  ALT  10  /  AlkPhos  52  03-01      Culture - Fungal, Body Fluid (collected 27 Feb 2023 10:02)  Source: .Body Fluid FUNGAL-PERICARDIAL FLUID CULTURE  Preliminary Report (28 Feb 2023 07:12):    Testing in progress    Culture - Body Fluid with Gram Stain (collected 27 Feb 2023 10:02)  Source: .Body Fluid PERICARDIAL FLUID CULTURE  Gram Stain (27 Feb 2023 16:28):    No organisms seen    Few WBC's  Preliminary Report (28 Feb 2023 08:01):    No growth to date    Culture - Blood (collected 27 Feb 2023 01:47)  Source: .Blood Blood  Preliminary Report (01 Mar 2023 03:00):    No growth at 2 days.    Culture - Blood (collected 27 Feb 2023 01:47)  Source: .Blood Blood  Preliminary Report (01 Mar 2023 03:00):    No growth at 2 days.    Culture - Urine (collected 27 Feb 2023 01:16)  Source: Clean Catch Clean Catch (Midstream)  Final Report (28 Feb 2023 09:10):    Specimen appears CONTAMINATED. Lab suggests repeat clean catch specimen.    PHYSICAL EXAM:  General: +tired appearing, AAOx3  HEENT: atraumatic, normocephalic  Pulmonary: CTAB, no wheezing, no increased WOB on RA w BIPAP o/n  Cardiovascular: Regular rate and rhythm, no friction rub or gallops. Normal S1S2  Gastrointestinal: Soft, nontender, nondistended; bowel sounds present  Musculoskeletal: 2+ peripheral pulses palpable, no clubbing or cyanosis, No edema noted on the LE bilaterally   Neurology: Pt. alert and oriented, fluent speech, able to move all extremities  Skin: L Hand blisters on knuckle area (similar from 2/28)    RADIOLOGY & ADDITIONAL TESTS REVIEWED: yes  < from: Xray Chest 1 View- PORTABLE-Routine (Xray Chest 1 View- PORTABLE-Routine in AM.) (03.01.23 @ 06:45) >  Similar appearance to prior exam 2/28/2022. Pericardial drainage catheter   again noted. Small pleural effusions. No acute infiltrate or lung   consolidation. No pneumothorax. Limited rotated examination.    < end of copied text >

## 2023-03-01 NOTE — PROGRESS NOTE ADULT - ASSESSMENT
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 w w/o of etiology for effusion with monitoring of drain output.     NEURO  JOSE J    CV  #Pericardial effusion  s/p pericardiocentesis in 07/2021 in [place]. On home lasix 40mg BID. Bedside echo 2/26: IVC collapsable, RA early diastolic collapse, early cardiac tamponade physiology. Echo 2/27: limited 2/2 pericardial effusion with grossly normal LV function w interval drainage of pericardial effusion compared to 2/26. Small Pleural Effusions on CXR. TTE 2/27 w trivial effusion w grossly normal EF   - S/p pericardial drain placement w 89ml/24 hour period including 9cc o/n (reduced from prior 24 hour period), can consider removal of drain.   - Pericardial fluid sterile w plasma cells w MM workup (concurrent anemia, borderline Ca levels) awaiting SPEP, UPEP and NITIN results (specimen received   - Continue to hold lasix, UOP net negative  - c/w OOBTC   - c/w Incentive spirometer   - PT rec'd YAMIL     #CHF  Pt with hx of CHF, unknown last EF. On home carvedilol 12.5 mg BID, enalapril 10mg. BNP and trop WNL at [place]. Not in current exacerbation. Extremities WWP.  - Patient mildly normotensive, continue to hold home carvedilol and enalapril  - c/w nifedipine 60 BID     #1st Degree AVB w intermittent LBBB   Baseline EKG NSR w 1st degree AVB w prolonged IN interval (apx 230). Patient w/o bradycardia or high degree block on telemetry. Presenting w trop 0.03 denies CP. Patient w/o sx of CP, SOB, lightheadedness. Patient w no known ischemic history   - EP consulted w/o need for intervention     #PVD  Hx of LE PVD. On [id number] and Atorvastatin 20mg. Per pt, no hx of stents. No current symptoms w elevated cholesterol and LDL, decreased HDL   -c/w Atorvastatin 20mg qd     #HTN  Pt with hx of HTN, on home nifedipine 60mg daily  - c/w nifedipine 60 BID     PULM  #CATE  Pt with hx of CATE, on home O2, around 4L (inconsistently)   - Satting well on RA w BIPAP at night   - c/w Incentive spirometer      GI  #GERD  Pt with hx of GERD, on home pantoprazole 40mg daily  -c.w pantoprazole 40mg daily    RENAL  #CKD stage III w hypokalemia  Pt with Cr 1.8 on admission, increasing on admission   - Maintenance fluids NS   - trend BUN/Cr daily  - Encourage PO intake    ENDO  #Dm  Admitted to [place] for hypoglycemia to reported 49mg/dl, 39mg/dL, then 29 mg/dL on home finger stick. On home glipizide 10mg qd, Sitagliptin 50mg.  -started on sliding scale lispro  -monitor FSG q6    HEME/ONC  #Anemia  Hgb 8.8 likely from CKD. No signs of active bleeding. Denies melena, hematuria, or hemoptysis.  Plan:  -maintain active T&S  -transfuse hgb<7    ID  hx of Fever 2/26 during hospital stay w Tmax 100.8, non-leukocytotic w elevated procal 1.03 w/o CP, subjective fever, SOB. 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 w w/o of etiology for effusion with monitoring of drain output.     NEURO  JOSE J    CV  #Pericardial effusion  s/p pericardiocentesis in 07/2021 in [place]. On home lasix 40mg BID. Bedside echo 2/26: IVC collapsable, RA early diastolic collapse, early cardiac tamponade physiology. Echo 2/27: limited 2/2 pericardial effusion with grossly normal LV function w interval drainage of pericardial effusion compared to 2/26. Small Pleural Effusions on CXR. TTE 2/27 w trivial effusion w grossly normal EF   - S/p pericardial drain placement w 89ml/24 hour period including 9cc o/n (reduced from prior 24 hour period), can consider removal of drain.   - Pericardial fluid sterile w plasma cells w MM workup (concurrent anemia, borderline Ca levels) awaiting SPEP, UPEP and NITIN results (specimen received   - Continue to hold lasix, UOP net negative  - c/w OOBTC   - c/w Incentive spirometer   - PT rec'd YAMIL     #CHF  Pt with hx of CHF, unknown last EF. On home carvedilol 12.5 mg BID, enalapril 10mg. BNP and trop WNL at [place]. Not in current exacerbation. Extremities WWP.  - Patient mildly normotensive, continue to hold home carvedilol and enalapril  - c/w nifedipine 60 BID     #1st Degree AVB w intermittent LBBB   Baseline EKG NSR w 1st degree AVB w prolonged IL interval (apx 230). Patient w/o bradycardia or high degree block on telemetry. Presenting w trop 0.03 denies CP. Patient w/o sx of CP, SOB, lightheadedness.  - Patient w no known ischemic history   - EP consulted w/o need for intervention     #PVD  Hx of LE PVD. On [id number] and Atorvastatin 20mg. Per pt, no hx of stents. No current symptoms w elevated cholesterol and LDL, decreased HDL   -c/w Atorvastatin 20mg qd     #HTN  Pt with hx of HTN, on home nifedipine 60mg daily  - c/w nifedipine 60 BID     PULM  #CATE  Pt with hx of CATE, on home O2, around 4L (inconsistently)   - Satting well on RA w BIPAP at night   - c/w Incentive spirometer      GI  #GERD  Pt with hx of GERD, on home pantoprazole 40mg daily  -c.w pantoprazole 40mg daily    RENAL  #CKD stage III w hypokalemia  Pt with Cr 1.8 on admission, increasing on admission   - Maintenance fluids NS   - trend BUN/Cr daily  - Encourage PO intake    ENDO  #Dm  Admitted to [place] for hypoglycemia to reported 49mg/dl, 39mg/dL, then 29 mg/dL on home finger stick. On home glipizide 10mg qd, Sitagliptin 50mg.  -started on sliding scale lispro  -monitor FSG q6    HEME/ONC  #Anemia  Hgb 8.8 likely from CKD. No signs of active bleeding. Denies melena, hematuria, or hemoptysis.  Plan:  -maintain active T&S  -transfuse hgb<7    ID  hx of Fever 2/26 during hospital stay w Tmax 100.8, non-leukocytotic w elevated procal 1.03 w/o CP, subjective fever, SOB. 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 w w/o of etiology for effusion with monitoring of drain output.     NEURO  JOSE J    CV  #Pericardial effusion  s/p pericardiocentesis in 07/2021 in [place]. On home lasix 40mg BID. Bedside echo 2/26: IVC collapsable, RA early diastolic collapse, early cardiac tamponade physiology. Echo 2/27: limited 2/2 pericardial effusion with grossly normal LV function w interval drainage of pericardial effusion compared to 2/26. Small Pleural Effusions on CXR. TTE 2/27 w trivial effusion w grossly normal EF   - S/p pericardial drain placement w 89ml/24 hour period including 9cc o/n (reduced from prior 24 hour period), can consider removal of drain.   - Pericardial fluid sterile w plasma cells w MM workup (concurrent anemia, borderline Ca levels) awaiting SPEP, UPEP and NITIN results (specimen received   - Continue to hold lasix, UOP net negative  - c/w OOBTC   - c/w Incentive spirometer   - PT rec'd YAMLI, patient lives by herself with support of her daughter/grandausoniater. She is open to [facility]     #CHF  Pt with hx of CHF, unknown last EF. On home carvedilol 12.5 mg BID, enalapril 10mg. BNP and trop WNL at [place]. Not in current exacerbation. Extremities WWP.  - Patient mildly normotensive, continue to hold home carvedilol and enalapril  - c/w nifedipine 60 BID     #1st Degree AVB w intermittent LBBB   Baseline EKG NSR w 1st degree AVB w prolonged CA interval (apx 230). Patient w/o bradycardia or high degree block on telemetry. Presenting w trop 0.03 denies CP. Patient w/o sx of CP, SOB, lightheadedness.  - Patient w no known ischemic history   - EP consulted w/o need for intervention     #PVD  Hx of LE PVD. On [id number] and Atorvastatin 20mg. Per pt, no hx of stents. No current symptoms w elevated cholesterol and LDL, decreased HDL   -c/w Atorvastatin 20mg qd     #HTN  Pt with hx of HTN, on home nifedipine 60mg daily  - c/w nifedipine 60 BID     PULM  #CATE  Pt with hx of CATE, on home O2, around 4L (inconsistently)   - Satting well on RA w BIPAP at night   - c/w Incentive spirometer      GI  #GERD  Pt with hx of GERD, on home pantoprazole 40mg daily  -c.w pantoprazole 40mg daily    RENAL  #CKD stage III w hypokalemia  Pt with Cr 1.8 on admission, increasing on admission   - Maintenance fluids NS   - trend BUN/Cr daily  - Encourage PO intake    ENDO  #Dm  Admitted to [place] for hypoglycemia to reported 49mg/dl, 39mg/dL, then 29 mg/dL on home finger stick. On home glipizide 10mg qd, Sitagliptin 50mg.  -started on sliding scale lispro  -monitor FSG q6    HEME/ONC  #Anemia  Hgb 8.8 likely from CKD. No signs of active bleeding. Denies melena, hematuria, or hemoptysis.  Plan:  -maintain active T&S  -transfuse hgb<7    ID  hx of Fever 2/26 during hospital stay w Tmax 100.8, non-leukocytotic w elevated procal 1.03 w/o CP, subjective fever, SOB. 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 w near syncope  found to be hypoglycemia w fingersticks as low as 29 found to have pericardial effusion s/p repeat pericardial drain at [facility] w w/o of etiology for effusion with monitoring of drain output.     NEURO  JOSE J    CV  #Pericardial effusion  s/p pericardiocentesis in 07/2021 in [place]. On home lasix 40mg BID. Bedside echo 2/26: IVC collapsable, RA early diastolic collapse, early cardiac tamponade physiology. Echo 2/27: limited 2/2 pericardial effusion with grossly normal LV function w interval drainage of pericardial effusion compared to 2/26. Small Pleural Effusions on CXR. TTE 2/27 w trivial effusion w grossly normal EF   - S/p pericardial drain placement w 89ml/24 hour period including 9cc o/n (reduced from prior 24 hour period), can consider removal of drain.   - Pericardial fluid sterile w plasma cells w MM workup (concurrent anemia, borderline Ca levels) awaiting SPEP, UPEP and NITIN results (specimen received   - Continue to hold lasix, UOP net negative  - c/w OOBTC   - c/w Incentive spirometer   - PT rec'd YAMIL, patient lives by herself with support of her daughter/grandaumulu. She is open to [facility]     #CHF  Pt with hx of CHF, unknown last EF. On home carvedilol 12.5 mg BID, enalapril 10mg. BNP and trop WNL at [place]. Not in current exacerbation. Extremities WWP.  - Patient mildly normotensive, continue to hold home carvedilol and enalapril  - c/w nifedipine 60 BID     #1st Degree AVB w intermittent LBBB   Baseline EKG NSR w 1st degree AVB w prolonged NV interval (apx 230). Patient w/o bradycardia or high degree block on telemetry. Presenting w trop 0.03 denies CP. Patient w/o sx of CP, SOB, lightheadedness.  - Patient w no known ischemic history   - D/c'd Coreg with improvement of 1st Degree AVB on updated EGC  - EP consulted w/o need for intervention     #PVD  Hx of LE PVD. On [id number] and Atorvastatin 20mg. Per pt, no hx of stents. No current symptoms w elevated cholesterol and LDL, decreased HDL   -c/w Atorvastatin 20mg qd     #HTN  Pt with hx of HTN, on home nifedipine 60mg daily  - c/w nifedipine 60 BID     PULM  #CATE  Pt with hx of CATE, on home O2, around 4L (inconsistently)   - Satting well on RA w BIPAP at night   - c/w Incentive spirometer      GI  #GERD  Pt with hx of GERD, on home pantoprazole 40mg daily  -c.w pantoprazole 40mg daily    RENAL  #CKD stage III w hypokalemia  Pt with Cr 1.8 on admission, increasing on admission   - Maintenance fluids NS   - trend BUN/Cr daily  - Encourage PO intake  - + 250cc Maintenance fluids     ENDO  #Dm  Admitted to [place] for hypoglycemia to reported 49mg/dl, 39mg/dL, then 29 mg/dL on home finger stick. On home glipizide 10mg qd, Sitagliptin 50mg.  -started on sliding scale lispro  -monitor FSG q6    HEME/ONC  #Anemia  Hgb 8.8 likely from CKD. No signs of active bleeding. Denies melena, hematuria, or hemoptysis.  Plan:  -maintain active T&S  -transfuse hgb<7    ID  hx of Fever 2/26 during hospital stay w Tmax 100.8, non-leukocytotic w elevated procal 1.03 w/o CP, subjective fever, SOB. 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     NEURO  JOSE J    CV  #Pericardial effusion  s/p pericardiocentesis in 07/2021 in [place]. On home lasix 40mg BID. Bedside echo 2/26: IVC collapsable, RA early diastolic collapse, early cardiac tamponade physiology. Echo 2/27: limited 2/2 pericardial effusion with grossly normal LV function w interval drainage of pericardial effusion compared to 2/26. Small Pleural Effusions on CXR. TTE 2/27 w trivial effusion w grossly normal EF   - S/p pericardial drain placement w 89ml/24 hour period including 9cc o/n (reduced from prior 24 hour period), can consider removal of drain.   - Pericardial fluid sterile w plasma cells w MM workup (concurrent anemia, borderline Ca levels) awaiting SPEP, UPEP and NITIN results (specimen received   - Continue to hold lasix, UOP net negative  - c/w OOBTC   - c/w Incentive spirometer   - PT rec'd YAMIL, patient lives by herself with support of her daughter/grandaumulu. She is open to [facility]     #CHF  Pt with hx of CHF, unknown last EF. On home carvedilol 12.5 mg BID, enalapril 10mg. BNP and trop WNL at [place]. Not in current exacerbation. Extremities WWP.  - Patient mildly normotensive, continue to hold home carvedilol and enalapril  - c/w nifedipine 60 BID     #1st Degree AVB w intermittent LBBB   Baseline EKG NSR w 1st degree AVB w prolonged DE interval (apx 230). Patient w/o bradycardia or high degree block on telemetry. Presenting w trop 0.03 denies CP. Patient w/o sx of CP, SOB, lightheadedness.  - Patient w no known ischemic history   - D/c'd Coreg with improvement of 1st Degree AVB on updated EGC  - EP consulted w/o need for intervention     #PVD  Hx of LE PVD. On [id number] and Atorvastatin 20mg. Per pt, no hx of stents. No current symptoms w elevated cholesterol and LDL, decreased HDL   -c/w Atorvastatin 20mg qd     #HTN  Pt with hx of HTN, on home nifedipine 60mg daily  - c/w nifedipine 60 BID     PULM  #CATE  Pt with hx of CATE, on home O2, around 4L (inconsistently)   - Satting well on RA w BIPAP at night   - c/w Incentive spirometer      GI  #GERD  Pt with hx of GERD, on home pantoprazole 40mg daily  -c.w pantoprazole 40mg daily    RENAL  #CKD stage III w hypokalemia  Pt with Cr 1.8 on admission, increasing on admission   - Maintenance fluids NS   - trend BUN/Cr daily  - Encourage PO intake  - + 250cc Maintenance fluids     ENDO  #Dm  Admitted to [place] for hypoglycemia to reported 49mg/dl, 39mg/dL, then 29 mg/dL on home finger stick. On home glipizide 10mg qd, Sitagliptin 50mg.  -started on sliding scale lispro  -monitor FSG q6    HEME/ONC  #Anemia  Hgb 8.8 likely from CKD. No signs of active bleeding. Denies melena, hematuria, or hemoptysis.  Plan:  -maintain active T&S  -transfuse hgb<7    ID  hx of Fever 2/26 during hospital stay w Tmax 100.8, non-leukocytotic w elevated procal 1.03 w/o CP, subjective fever, SOB. 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 w w/o of etiology for effusion with monitoring of drain output. Drain removed on 3/1 and was stable for stepdown thereafter.    NEURO  JOSE J    CV  #Pericardial effusion  s/p pericardiocentesis in 07/2021 in [place]. On home lasix 40mg BID. Bedside echo 2/26: IVC collapsable, RA early diastolic collapse, early cardiac tamponade physiology. Echo 2/27: limited 2/2 pericardial effusion with grossly normal LV function w interval drainage of pericardial effusion compared to 2/26. Small Pleural Effusions on CXR. TTE 2/27 w trivial effusion w grossly normal EF   - S/p pericardial drain placement w 89ml/24 hour period including 9cc o/n (reduced from prior 24 hour period); drain removed on 3/1.   - Pericardial fluid initially saw plasma cells, proceeded w MM workup (concurrent anemia, borderline Ca levels) awaiting SPEP, UPEP and NITIN results (specimen received   - Continue to hold lasix, UOP net negative  - c/w OOBTC   - c/w Incentive spirometer     #CHF  Pt with hx of CHF, unknown last EF. On home carvedilol 12.5 mg BID, enalapril 10mg. BNP and trop WNL at [place]. Not in current exacerbation. Extremities WWP.  - Patient mildly normotensive, continue to hold home carvedilol and enalapril  - c/w nifedipine 60 BID     #1st Degree AVB w intermittent LBBB   Baseline EKG NSR w 1st degree AVB w prolonged CO interval (apx 230). Patient w/o bradycardia or high degree block on telemetry. Presenting w trop 0.03 denies CP. Patient w/o sx of CP, SOB, lightheadedness.  - Patient w no known ischemic history   - D/c'd Coreg with improvement of 1st Degree AVB on updated EGC  - EP consulted w/o need for intervention     #PVD  Hx of LE PVD. On [id number] and Atorvastatin 20mg. Per pt, no hx of stents. No current symptoms w elevated cholesterol and LDL, decreased HDL   -c/w Atorvastatin 20mg qd     #HTN  Pt with hx of HTN, on home nifedipine 60mg daily  - c/w nifedipine 60 BID     PULM  #CATE  Pt with hx of CATE, on home O2, around 4L (inconsistently)   - Satting well on RA w BIPAP at night   - c/w Incentive spirometer      GI  #GERD  Pt with hx of GERD, on home pantoprazole 40mg daily  -c.w pantoprazole 40mg daily    RENAL  #CKD stage III w hypokalemia  Pt with Cr 1.8 on admission, increasing on admission   - Maintenance fluids NS   - trend BUN/Cr daily  - Encourage PO intake  - + 250cc Maintenance fluids PRN    ENDO  #DM  Admitted to [place] for hypoglycemia to reported 49mg/dl, 39mg/dL, then 29 mg/dL on home finger stick. On home glipizide 10mg qd, Sitagliptin 50mg.  -started on sliding scale lispro  -monitor FSG q6    HEME/ONC  #Anemia  Hgb 8.8 likely from CKD. No signs of active bleeding. Denies melena, hematuria, or hemoptysis.  Plan:  -maintain active T&S  -transfuse hgb<7    ID  hx of Fever 2/26 during hospital stay w Tmax 100.8, non-leukocytotic w elevated procal 1.03 w/o CP, subjective fever, SOB. UA neg likely 2/2 reactive fever due to insertion of pericardial drain (2/26)  - No fevers O/N  - Blood Cultures (2/27) NGTD   - Tylenol PRN  - Monitoring off of Abx      Prophylaxis   F: NS  E: replete prn, particular attention to hypokalemia   N: Regular Diet + Ensure Supplementation   DVT: Lovenox   - PT rec'd YAMIL, patient lives by herself with support of her daughter/grandaughter. She is open to YAMIL      84 y/o F PMH CHF (unknown previous EF), HTN, DM, PVD, stage 3CKD, CHF, CATE on home O2 (appx 4L w inconsistent use), recurrent pericardial effusion (s/p drainage 2021) who presented to [facility] w near syncope  found to be hypoglycemia w fingersticks as low as 29 found to have pericardial effusion s/p repeat pericardial drain at [facility] w w/o of etiology for effusion with monitoring of drain output. Drain removed on 3/1 and was stable for stepdown thereafter.    NEURO  JOSE J    CV  #Pericardial effusion  s/p pericardiocentesis in 07/2021 in [place]. On home lasix 40mg BID. Bedside echo 2/26: IVC collapsable, RA early diastolic collapse, early cardiac tamponade physiology. Echo 2/27: limited 2/2 pericardial effusion with grossly normal LV function w interval drainage of pericardial effusion compared to 2/26. Small Pleural Effusions on CXR. TTE 2/27 w trivial effusion w grossly normal EF   - S/p pericardial drain placement w 89ml/24 hour period including 9cc o/n (reduced from prior 24 hour period); drain removed on 3/1.   - Pericardial fluid initially saw plasma cells w MM workup (concurrent anemia, borderline Ca levels) awaiting SPEP, UPEP and NITIN results (specimen received) w/o malignant cells   - Continue to hold lasix, UOP net negative  - c/w OOBTC   - c/w Incentive spirometer     #CHF  Pt with hx of CHF, unknown last EF. On home carvedilol 12.5 mg BID, enalapril 10mg. BNP and trop WNL at [place]. Not in current exacerbation. Extremities WWP.  - Patient mildly normotensive, continue to hold home carvedilol and enalapril  - c/w nifedipine 60 BID     #1st Degree AVB w intermittent LBBB   Baseline EKG NSR w 1st degree AVB w prolonged IN interval (apx 230). Patient w/o bradycardia or high degree block on telemetry. Presenting w trop 0.03 denies CP. Patient w/o sx of CP, SOB, lightheadedness.  - Patient w no known ischemic history   - D/c'd Coreg with improvement of 1st Degree AVB on updated EGC  - EP consulted w/o need for intervention     #PVD  Hx of LE PVD. On [id number] and Atorvastatin 20mg. Per pt, no hx of stents. No current symptoms w elevated cholesterol and LDL, decreased HDL   -c/w Atorvastatin 20mg qd     #HTN  Pt with hx of HTN, on home nifedipine 60mg daily  - c/w nifedipine 60 BID     PULM  #CATE  Pt with hx of CATE, on home O2, around 4L (inconsistently)   - Satting well on RA w BIPAP at night   - c/w Incentive spirometer      GI  #GERD  Pt with hx of GERD, on home pantoprazole 40mg daily  -c.w pantoprazole 40mg daily    RENAL  #CKD stage III w hypokalemia  Pt with Cr 1.8 on admission, increasing on admission   - Maintenance fluids NS   - trend BUN/Cr daily  - Encourage PO intake  - + 250cc Maintenance fluids PRN    ENDO  #DM  Admitted to [place] for hypoglycemia to reported 49mg/dl, 39mg/dL, then 29 mg/dL on home finger stick. On home glipizide 10mg qd, Sitagliptin 50mg.  -started on sliding scale lispro  -monitor FSG q6    HEME/ONC  #Anemia  Hgb 8.8 likely from CKD. No signs of active bleeding. Denies melena, hematuria, or hemoptysis.  Plan:  -maintain active T&S  -transfuse hgb<7    ID  hx of Fever 2/26 during hospital stay w Tmax 100.8, non-leukocytotic w elevated procal 1.03 w/o CP, subjective fever, SOB. UA neg likely 2/2 reactive fever due to insertion of pericardial drain (2/26)  - No fevers O/N  - Blood Cultures (2/27) NGTD   - Tylenol PRN  - Monitoring off of Abx      Prophylaxis   F: NS  E: replete prn, particular attention to hypokalemia   N: Regular Diet + Ensure Supplementation   DVT: Lovenox   - PT rec'd YAMIL, patient lives by herself with support of her daughter/grandaughter. 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 Drain removed on 3/1 and was stable for stepdown thereafter.    NEURO  JOSE J    CV  #Pericardial effusion  s/p pericardiocentesis in 07/2021 in [place]. On home lasix 40mg BID. Bedside echo 2/26: IVC collapsable, RA early diastolic collapse, early cardiac tamponade physiology. Echo 2/27: limited 2/2 pericardial effusion with grossly normal LV function w interval drainage of pericardial effusion compared to 2/26. Small Pleural Effusions on CXR. TTE 2/27 w trivial effusion w grossly normal EF   - S/p pericardial drain placement w 89ml/24 hour period including 9cc o/n (reduced from prior 24 hour period); drain removed on 3/1.   - Pericardial fluid initially saw plasma cells subsequently proceeded with a MM workup (concurrent anemia, borderline Ca levels) awaiting SPEP, UPEP and NITIN results (specimen received); final pathology report showing no malignant cells   - Continue to hold lasix, UOP net negative  - c/w OOBTC   - c/w Incentive spirometer     #CHF  Pt with hx of CHF, unknown last EF. On home carvedilol 12.5 mg BID, enalapril 10mg. BNP and trop WNL at [place]. Not in current exacerbation. Extremities WWP.  - Patient mildly normotensive, continue to hold home carvedilol and enalapril  - c/w nifedipine 60 BID     #1st Degree AVB w intermittent LBBB   Baseline EKG NSR w 1st degree AVB w prolonged UT interval (apx 230). Patient w/o bradycardia or high degree block on telemetry. Presenting w trop 0.03 denies CP. Patient w/o sx of CP, SOB, lightheadedness.  - Patient w no known ischemic history   - D/c'd Coreg with improvement of 1st Degree AVB on updated EGC  - EP consulted w/o need for intervention     #PVD  Hx of LE PVD. On [id number] and Atorvastatin 20mg. Per pt, no hx of stents. No current symptoms w elevated cholesterol and LDL, decreased HDL   -c/w Atorvastatin 20mg qd     #HTN  Pt with hx of HTN, on home nifedipine 60mg daily  - c/w nifedipine 60 BID     PULM  #CATE  Pt with hx of CATE, on home O2, around 4L (inconsistently)   - Satting well on RA w BIPAP at night   - c/w Incentive spirometer      GI  #GERD  Pt with hx of GERD, on home pantoprazole 40mg daily  -c.w pantoprazole 40mg daily    RENAL  #CKD stage III w hypokalemia  Pt with Cr 1.8 on admission, increasing on admission   - Maintenance fluids NS   - trend BUN/Cr daily  - Encourage PO intake  - + 250cc Maintenance fluids PRN    ENDO  #DM  Admitted to [place] for hypoglycemia to reported 49mg/dl, 39mg/dL, then 29 mg/dL on home finger stick. On home glipizide 10mg qd, Sitagliptin 50mg.  -started on sliding scale lispro  -monitor FSG q6    HEME/ONC  #Anemia  Hgb 8.8 likely from CKD. No signs of active bleeding. Denies melena, hematuria, or hemoptysis.  Plan:  -maintain active T&S  -transfuse hgb<7    ID  hx of Fever 2/26 during hospital stay w Tmax 100.8, non-leukocytotic w elevated procal 1.03 w/o CP, subjective fever, SOB. UA neg likely 2/2 reactive fever due to insertion of pericardial drain (2/26)  - No fevers O/N  - Blood Cultures (2/27) NGTD   - Tylenol PRN  - Monitoring off of Abx      Prophylaxis   F: NS  E: replete prn, particular attention to hypokalemia   N: Regular Diet + Ensure Supplementation   DVT: Lovenox   - PT rec'd YAIML, patient lives by herself with support of her daughter/grandaughter. She is open to YAMIL

## 2023-03-01 NOTE — PROGRESS NOTE ADULT - PROBLEM SELECTOR PLAN 5
Pt with hx of HTN, on home nifedipine 60mg daily  - c/w nifedipine 60 BID Pt with hx of HTN, on nifedipine 60mg daily at home (Home dose)  - c/w nifedipine 60 Q12 while inpt

## 2023-03-01 NOTE — DISCHARGE NOTE PROVIDER - HOSPITAL COURSE
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 Pt developed tachypnea in ED, PE showed BL crackles, bedside echo at [place] showed pericardial effusion. Vitals at [place] during admission /63, HR 85, O2 95%. EKG showed NSR, LBBB and electrical alternans which was confirmed by telemetry BNP 75, Cr 2.13/BUN 52, Trop T 13.7, Patient w pericardial effusion in 07/2021 in [place] and underwent pericardiocentesis with an unknown etiology. Of note, pt was recently hospitalized on 2/13 at [facility] for SOB, BL LE edema, her home lasix 40mg was increased to q12 from qd, limited by Cr/BUN 2.13/ 52. Transferred to [facility] for further management with pericardiocentesis vs pericardial window. EKG on admission w a [place] interval 296, 1st degree AV block, PVCs. During admission the patient underwent a repeat TTE on 2/26 with increased effusion and subsequent pericardiocentesis (2/26) w drain in place. The drain output w 850cc serous fluid out. The patient had some fevers (likely reactive to placement of drain) w blood cultures that were negative with normal WBC. Pericardial fluid analysis was done which showed plasma cells (w concurrent anemia and borderline calcium levels) promting a MM workup w SPEP UPEP and NITIN with pending results. The patient continued to have decreased drainage from the drain. A repeat Echo was done on 2/28 which was limited WNL EF. 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 Pt developed tachypnea in ED, PE showed BL crackles, bedside echo at [place] showed pericardial effusion. EKG showed NSR, LBBB and electrical alternans which was confirmed by telemetry BNP 75, Cr 2.13/BUN 52, Trop T 13.7, Patient w pericardial effusion in 07/2021 in [place] and underwent pericardiocentesis with an unknown etiology. Of note, pt was recently hospitalized on 2/13 at [facility] for SOB, BL LE edema, her home lasix 40mg was increased to q12 from qd, limited by Cr/BUN 2.13/ 52. Transferred to [facility] for further management with pericardiocentesis. EKG on admission w a [place] interval 296, 1st degree AV block, PVCs. During admission the patient underwent a repeat TTE on 2/26 with increased effusion and subsequent pericardiocentesis (2/26) w drain in place. The drain output w 850cc serous fluid out. Pericardial fluid analysis was done which showed plasma cells (w concurrent anemia and borderline calcium levels) promting a MM workup w SPEP UPEP and NITIN with pending results. A repeat Echo was done on 2/28 which was limited WNL EF. Patient w removal of drain w outpatient followup w additional PT workup suggesting YAMIL.        NEURO  JOSE J    CV  #Pericardial effusion  s/p pericardiocentesis in 07/2021 in [place]. On home lasix 40mg BID. Bedside echo 2/26: IVC collapsable, RA early diastolic collapse, early cardiac tamponade physiology. Echo 2/27: limited 2/2 pericardial effusion with grossly normal LV function w interval drainage of pericardial effusion compared to 2/26. Small Pleural Effusions on CXR. TTE 2/27 w trivial effusion w grossly normal EF. S/p pericardial drain placement w 89ml/24 hour period including 9cc o/n (reduced from prior 24 hour period), can consider removal of drain. Pericardial fluid sterile w plasma cells w MM workup (concurrent anemia, borderline Ca levels) awaiting SPEP, UPEP and NITIN results (specimen received   - PT rec'd YAMIL, patient lives by herself with support of her daughter / grandaughter.    #CHF  Pt with hx of CHF, unknown last EF. On home carvedilol 12.5 mg BID, enalapril 10mg. BNP and trop WNL at [place]. Not in current exacerbation. Extremities WWP. Patient mildly normotensive, continue to hold home carvedilol and enalapril  - c/w nifedipine 60 BID     #1st Degree AVB w intermittent LBBB   Baseline EKG NSR w 1st degree AVB w prolonged [place] interval (apx 230). Patient w/o bradycardia or high degree block on telemetry. Presenting w trop 0.03 denies CP. Patient w/o sx of CP, SOB, lightheadedness. EP consulted w/o need for intervention   - JOSE J    #PVD  Hx of LE PVD. On [id number] and Atorvastatin 20mg. Per pt, no hx of stents. No current symptoms w elevated cholesterol and LDL, decreased HDL   -c/w Atorvastatin 20mg qd     #HTN  Pt with hx of HTN, on home nifedipine 60mg daily  - c/w nifedipine 60 BID     PULM  #CATE  Pt with hx of CATE, on home O2, around 4L (inconsistently)   - Satting well on RA w BIPAP at night   - c/w Incentive spirometer      GI  #GERD  Pt with hx of GERD, on home pantoprazole 40mg daily  -c.w pantoprazole 40mg daily    RENAL  #CKD stage III w hypokalemia  Pt with Cr 1.8 on admission, increasing on admission   - Encourage PO intake    ENDO  #Dm  Admitted to [place] for hypoglycemia to reported 49mg/dl, 39mg/dL, then 29 mg/dL on home finger stick. On home glipizide 10mg qd, Sitagliptin 50mg.  -started on sliding scale lispro  -monitor FSG q6    HEME/ONC  #Anemia  Hgb 8.8 likely from CKD. No signs of active bleeding. Denies melena, hematuria, or hemoptysis.  Plan:  -maintain active T&S  -transfuse hgb<7    ID  hx of Fever 2/26 during hospital stay w Tmax 100.8, non-leukocytotic w elevated procal 1.03 w/o CP, subjective fever, SOB. 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 Pt developed tachypnea in ED, PE showed BL crackles, bedside echo at [place] showed pericardial effusion. EKG showed NSR, LBBB and electrical alternans which was confirmed by telemetry BNP 75, Cr 2.13/BUN 52, Trop T 13.7, Patient w pericardial effusion in 07/2021 in [place] and underwent pericardiocentesis with an unknown etiology. Of note, pt was recently hospitalized on 2/13 at [facility] for SOB, BL LE edema, her home lasix 40mg was increased to q12 from qd, limited by Cr/BUN 2.13/ 52. Transferred to [facility] for further management with pericardiocentesis. EKG on admission w a [place] interval 296, 1st degree AV block, PVCs. During admission the patient underwent a repeat TTE on 2/26 with increased effusion and subsequent pericardiocentesis (2/26) w drain in place. The drain output w 850cc serous fluid out. Pericardial fluid analysis was done which showed plasma cells (w concurrent anemia and borderline calcium levels) promting a MM workup w SPEP UPEP and NITIN with pending results. A repeat Echo was done on 2/28 which was limited WNL EF. Patient w removal of drain w outpatient followup w additional PT workup suggesting YAMIL.    NEURO  JOSE J    CV  #Pericardial effusion  s/p pericardiocentesis in 07/2021 in [place]. On home lasix 40mg BID. Bedside echo 2/26: IVC collapsable, RA early diastolic collapse, early cardiac tamponade physiology. Echo 2/27: limited 2/2 pericardial effusion with grossly normal LV function w interval drainage of pericardial effusion compared to 2/26. Small Pleural Effusions on CXR. TTE 2/27 w trivial effusion w grossly normal EF. S/p pericardial drain placement w 89ml/24 hour period including 9cc o/n (reduced from prior 24 hour period), can consider removal of drain. Pericardial fluid sterile w plasma cells w MM workup (concurrent anemia, borderline Ca levels) awaiting SPEP, UPEP and NITIN results (specimen received   - PT rec'd YAMIL, patient lives by herself with support of her daughter / grandaughter.    #CHF  Pt with hx of CHF, unknown last EF. On home carvedilol 12.5 mg BID, enalapril 10mg. BNP and trop WNL at [place]. Not in current exacerbation. Extremities WWP. Patient mildly normotensive, continue to hold home carvedilol and enalapril  - c/w nifedipine 60 BID     #1st Degree AVB w intermittent LBBB   Baseline EKG NSR w 1st degree AVB w prolonged [place] interval (apx 230). Patient w/o bradycardia or high degree block on telemetry. Presenting w trop 0.03 denies CP. Patient w/o sx of CP, SOB, lightheadedness. EP consulted w/o need for intervention   - JOSE J    #PVD  Hx of LE PVD. On [id number] and Atorvastatin 20mg. Per pt, no hx of stents. No current symptoms w elevated cholesterol and LDL, decreased HDL   -c/w Atorvastatin 20mg qd     #HTN  Pt with hx of HTN, on home nifedipine 60mg daily  - c/w nifedipine 60 BID     PULM  #CATE  Pt with hx of CATE, on home O2, around 4L (inconsistently)   - Satting well on RA w BIPAP at night   - c/w Incentive spirometer      GI  #GERD  Pt with hx of GERD, on home pantoprazole 40mg daily  -c.w pantoprazole 40mg daily    RENAL  #CKD stage III w hypokalemia  Pt with Cr 1.8 on admission, increasing on admission   - Encourage PO intake    ENDO  #Dm  Admitted to [place] for hypoglycemia to reported 49mg/dl, 39mg/dL, then 29 mg/dL on home finger stick. On home glipizide 10mg qd, Sitagliptin 50mg.  -started on sliding scale lispro  -monitor FSG q6    HEME/ONC  #Anemia  Hgb 8.8 likely from CKD. No signs of active bleeding. Denies melena, hematuria, or hemoptysis.  Plan:  -maintain active T&S  -transfuse hgb<7    ID  hx of Fever 2/26 during hospital stay w Tmax 100.8, non-leukocytotic w elevated procal 1.03 w/o CP, subjective fever, SOB. 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 for weakness, near syncopal episode, decreased appetite, dizziness, warmth, and nausea, reporting fingerstick at home 49mg/dl, 39mg/dL, then 29 mg/dL, admitted to [place] for hypoglycemia. Pt developed tachypnea in ED, PE showed BL crackles, bedside echo at [place] showed pericardial effusion. EKG showed NSR, LBBB and electrical alternans which was confirmed by telemetry BNP 75, Cr 2.13/BUN 52, Trop T 13.7, Patient w pericardial effusion in 07/2021 in [place] and underwent pericardiocentesis with an unknown etiology. Of note, pt was recently hospitalized on 2/13 at [facility] for SOB, BL LE edema, her home lasix 40mg was increased to q12 from qd, limited by Cr/BUN 2.13/ 52. Transferred to [facility] for further management with pericardiocentesis. EKG on admission w a [place] interval 296, 1st degree AV block, PVCs. During admission the patient underwent a repeat TTE on 2/26 with increased effusion and subsequent pericardiocentesis (2/26) w drain in place. The drain output w 850cc serous fluid out. Pericardial fluid analysis was done which showed plasma cells (w concurrent anemia and borderline calcium levels) promting a MM workup w SPEP UPEP and NITIN with pending results. A repeat Echo was done on 2/28 which was limited WNL EF. Patient w removal of drain w outpatient followup w additional PT workup suggesting YAMIL.    NEURO  JOSE J    CV  #Pericardial effusion  s/p pericardiocentesis in 07/2021 in [place]. On home lasix 40mg BID. Bedside echo 2/26: IVC collapsable, RA early diastolic collapse, early cardiac tamponade physiology. Echo 2/27: limited 2/2 pericardial effusion with grossly normal LV function w interval drainage of pericardial effusion compared to 2/26. Small Pleural Effusions on CXR. TTE 2/27 w trivial effusion w grossly normal EF. S/p pericardial drain placement w 89ml/24 hour period including 9cc o/n (reduced from prior 24 hour period), can consider removal of drain. Pericardial fluid sterile w plasma cells w MM workup (concurrent anemia, borderline Ca levels) awaiting SPEP, UPEP and NITIN results (specimen received   - PT rec'd YAMIL, patient lives by herself with support of her daughter / grandaughter.    #CHF  Pt with hx of CHF, unknown last EF. On home carvedilol 12.5 mg BID, enalapril 10mg. BNP and trop WNL at [place]. Not in current exacerbation. Extremities WWP. Patient mildly normotensive, continue to hold home carvedilol and enalapril  - c/w nifedipine 60 BID     #1st Degree AVB w intermittent LBBB   Baseline EKG NSR w 1st degree AVB w prolonged [place] interval (apx 230). Patient w/o bradycardia or high degree block on telemetry. Presenting w trop 0.03 denies CP. Patient w/o sx of CP, SOB, lightheadedness. EP consulted w/o need for intervention   - JOSE J    #PVD  Hx of LE PVD. On [id number] and Atorvastatin 20mg. Per pt, no hx of stents. No current symptoms w elevated cholesterol and LDL, decreased HDL   -c/w Atorvastatin 20mg qd     #HTN  Pt with hx of HTN, on home nifedipine 60mg daily  - c/w nifedipine 60 BID     PULM  #CATE  Pt with hx of CATE, on home O2, around 4L (inconsistently)   - Satting well on RA w BIPAP at night   - c/w Incentive spirometer      GI  #GERD  Pt with hx of GERD, on home pantoprazole 40mg daily  -c.w pantoprazole 40mg daily    RENAL  #CKD stage III w hypokalemia  Pt with Cr 1.8 on admission, increasing on admission   - Encourage PO intake    ENDO  #Dm  Admitted to [place] for hypoglycemia to reported 49mg/dl, 39mg/dL, then 29 mg/dL on home finger stick. On home glipizide 10mg qd, Sitagliptin 50mg.  -started on sliding scale lispro  -monitor FSG q6    HEME/ONC  #Anemia  Hgb 8.8 likely from CKD. No signs of active bleeding. Denies melena, hematuria, or hemoptysis.  Plan:  -maintain active T&S  -transfuse hgb<7    ID  hx of Fever 2/26 during hospital stay w Tmax 100.8, non-leukocytotic w elevated procal 1.03 w/o CP, subjective fever, SOB. 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 Pt developed tachypnea in ED, PE showed BL crackles, bedside echo at [place] showed pericardial effusion. EKG showed NSR, LBBB and electrical alternans which was confirmed by telemetry BNP 75, Cr 2.13/BUN 52, Trop T 13.7, Patient w pericardial effusion in 07/2021 in [place] and underwent pericardiocentesis with an unknown etiology. Of note, pt was recently hospitalized on 2/13 at [facility] for SOB, BL LE edema, her home lasix 40mg was increased to q12 from qd, limited by Cr/BUN 2.13/ 52. Transferred to [facility] for further management with pericardiocentesis. EKG on admission w a [place] interval 296, 1st degree AV block, PVCs. During admission the patient underwent a repeat TTE on 2/26 with increased effusion and subsequent pericardiocentesis (2/26) w drain in place. The drain output w 850cc serous fluid out. Pericardial fluid analysis was done which showed plasma cells (w concurrent anemia and borderline calcium levels) promting a MM workup w SPEP UPEP and NITIN with pending results. A repeat Echo was done on 2/28 which was limited WNL EF. Patient w removal of drain w outpatient followup w additional PT workup suggesting YAMIL.    NEURO  JOSE J    CV  #Pericardial effusion  s/p pericardiocentesis in 07/2021 in [place]. On home lasix 40mg BID. Bedside echo 2/26: IVC collapsable, RA early diastolic collapse, early cardiac tamponade physiology. Echo 2/27: limited 2/2 pericardial effusion with grossly normal LV function w interval drainage of pericardial effusion compared to 2/26. Small Pleural Effusions on CXR. TTE 2/27 w trivial effusion w grossly normal EF. S/p pericardial drain placement w 89ml/24 hour period including 9cc o/n (reduced from prior 24 hour period), can consider removal of drain. Pericardial fluid sterile w plasma cells w MM workup (concurrent anemia, borderline Ca levels) awaiting SPEP, UPEP and NITIN results (specimen received   - PT rec'd YAMIL, patient lives by herself with support of her daughter / grandaughter.    #CHF  Pt with hx of CHF, unknown last EF. On home carvedilol 12.5 mg BID, enalapril 10mg. BNP and trop WNL at [place]. Not in current exacerbation. Extremities WWP. Patient mildly normotensive, continue to hold home carvedilol and enalapril  - c/w nifedipine 60 BID     #1st Degree AVB w intermittent LBBB   Baseline EKG NSR w 1st degree AVB w prolonged [place] interval (apx 230). Patient w/o bradycardia or high degree block on telemetry. Presenting w trop 0.03 denies CP. Patient w/o sx of CP, SOB, lightheadedness. EP consulted w/o need for intervention   - JOSE J  - As per EP; acceptable to start Coreg 3.125 Q12 on DC for HTN control    #PVD  Hx of LE PVD. On [id number] and Atorvastatin 20mg. Per pt, no hx of stents. No current symptoms w elevated cholesterol and LDL, decreased HDL   -c/w Atorvastatin 20mg qd     #HTN  Pt with hx of HTN, on home nifedipine 60mg daily  - c/w nifedipine 60 BID   - C/w Hydral 75 Q8  - As per EP; acceptable to start Coreg 3.125 Q12 on DC for HTN control    PULM  #CATE  Pt with hx of CATE, on home O2, around 4L (inconsistently)   - Satting well on RA w BIPAP at night   - c/w Incentive spirometer      GI  #GERD  Pt with hx of GERD, on home pantoprazole 40mg daily  -c.w pantoprazole 40mg daily    RENAL  #CKD stage III w hypokalemia  Pt with Cr 1.8 on admission, increasing on admission   - Encourage PO intake    ENDO  #Dm  Admitted to [place] for hypoglycemia to reported 49mg/dl, 39mg/dL, then 29 mg/dL on home finger stick. On home glipizide 10mg qd, Sitagliptin 50mg.  -started on sliding scale lispro  -monitor FSG q6    HEME/ONC  #Anemia  Hgb 8.8 likely from CKD. No signs of active bleeding. Denies melena, hematuria, or hemoptysis.  Plan:  -maintain active T&S  -transfuse hgb<7    ID  hx of Fever 2/26 during hospital stay w Tmax 100.8, non-leukocytotic w elevated procal 1.03 w/o CP, subjective fever, SOB. 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 Pt developed tachypnea in ED, PE showed BL crackles, bedside echo at [place] showed pericardial effusion. EKG showed NSR, LBBB and electrical alternans which was confirmed by telemetry BNP 75, Cr 2.13/BUN 52, Trop T 13.7, Patient w pericardial effusion in 07/2021 in [place] and underwent pericardiocentesis with an unknown etiology. Of note, pt was recently hospitalized on 2/13 at [facility] for SOB, BL LE edema, her home lasix 40mg was increased to q12 from qd, limited by Cr/BUN 2.13/ 52. Transferred to [facility] for further management with pericardiocentesis. EKG on admission w a [place] interval 296, 1st degree AV block, PVCs. During admission the patient underwent a repeat TTE on 2/26 with increased effusion and subsequent pericardiocentesis (2/26) w drain in place. The drain output w 850cc serous fluid out. Pericardial fluid analysis was done which showed plasma cells (w concurrent anemia and borderline calcium levels) promting a MM workup w SPEP UPEP and NITIN with pending results. A repeat Echo was done on 2/28 which was limited WNL EF. Patient w removal of drain w outpatient followup w additional PT workup suggesting YAMIL.    NEURO  JOSE J    CV  #Pericardial effusion  s/p pericardiocentesis in 07/2021 in [place]. On home lasix 40mg BID. Bedside echo 2/26: IVC collapsable, RA early diastolic collapse, early cardiac tamponade physiology. Echo 2/27: limited 2/2 pericardial effusion with grossly normal LV function w interval drainage of pericardial effusion compared to 2/26. Small Pleural Effusions on CXR. TTE 2/27 w trivial effusion w grossly normal EF. S/p pericardial drain placement w 89ml/24 hour period including 9cc o/n (reduced from prior 24 hour period), can consider removal of drain. Pericardial fluid sterile w plasma cells w MM workup (concurrent anemia, borderline Ca levels) awaiting SPEP, UPEP and NITIN results (specimen received   - PT rec'd YAMIL, patient lives by herself with support of her daughter / grandaughter.    #CHF  Pt with hx of CHF, unknown last EF. On home carvedilol 12.5 mg BID, enalapril 10mg. BNP and trop WNL at [place]. Not in current exacerbation. Extremities WWP. Patient mildly normotensive, continue to hold home carvedilol and enalapril  - c/w nifedipine 60 BID     #1st Degree AVB w intermittent LBBB   Baseline EKG NSR w 1st degree AVB w prolonged [place] interval (apx 230). Patient w/o bradycardia or high degree block on telemetry. Presenting w trop 0.03 denies CP. Patient w/o sx of CP, SOB, lightheadedness. EP consulted w/o need for intervention   - JOSE J  - As per EP; try to avoid BB on discharge iso this 1 degree AVB with intermittent LBBB    #PVD  Hx of LE PVD. On [id number] and Atorvastatin 20mg. Per pt, no hx of stents. No current symptoms w elevated cholesterol and LDL, decreased HDL   -c/w Atorvastatin 20mg qd     #HTN  Pt with hx of HTN, on home nifedipine 60mg daily  - c/w nifedipine 60 BID   - C/w Hydral 75 Q8  - BMP a few weeks after discharge, if pt Cr normalizes CONSIDER starting Enalapril to attempt to control BP if still not at goal    PULM  #CATE  Pt with hx of CATE, on home O2, around 4L (inconsistently)   - Satting well on RA w BIPAP at night   - c/w Incentive spirometer      GI  #GERD  Pt with hx of GERD, on home pantoprazole 40mg daily  -c.w pantoprazole 40mg daily    RENAL  #CKD stage III w hypokalemia  Pt with Cr 1.8 on admission, increasing on admission   - Encourage PO intake    ENDO  #Dm  Admitted to [place] for hypoglycemia to reported 49mg/dl, 39mg/dL, then 29 mg/dL on home finger stick. On home glipizide 10mg qd, Sitagliptin 50mg.  -started on sliding scale lispro  -monitor FSG q6    HEME/ONC  #Anemia  Hgb 8.8 likely from CKD. No signs of active bleeding. Denies melena, hematuria, or hemoptysis.  Plan:  -maintain active T&S  -transfuse hgb<7    ID  hx of Fever 2/26 during hospital stay w Tmax 100.8, non-leukocytotic w elevated procal 1.03 w/o CP, subjective fever, SOB. UA neg likely 2/2 reactive fever due to insertion of pericardial drain (2/26)  - Blood Cultures (2/27) NGTD     F: NS  E: replete prn, particular attention to hypokalemia   N: Regular Diet + Ensure Supplementation   DVT: Lovenox     New Meds:   Hydral increased from 50 to 75Q8    Outpt studies:  BMP a few weeks after discharge, if pt Cr normalizes CONSIDER starting Enalapril to attempt to control BP if still not at goal

## 2023-03-01 NOTE — PROGRESS NOTE ADULT - SUBJECTIVE AND OBJECTIVE BOX
---------------------------------TRANSFER FROM [facility] TO [facility]-------------------------------------------    Patient is a 84 y/o F PMH CHF, HTN, DM, PVD, stage 3CKD, CHF, CATE (~4L O2 at home) gout, recurrent pericardial effusion, who presented to [facility] for weakness, near syncopal episode, decreased appetite, dizziness, warmth, and nausea, reporting fingerstick at home 49mg/dl, 39mg/dL, then 29 mg/dL, admitted to [place] for hypoglycemia w associated lightheadedness and SOB. Pt developed tachypnea in ED w PE showing BL crackles. Bedside echo at [place] showed a large pericardial effusion w/ echocardiographic signs of tamponade, now s/p pericardiocentesis w/ ~850cc of serous fluid drained. Drain kept in place w reducing drainage daily. Repeat TTE w trivial effusion w pericardial fluid analysis showing Plasma cells (w anemia, borderline Ca levels) promoting MM workup w sending of SPEP (pending results). Of note the patient had an episode of pericardial effusion in 07/2021 in [place] and underwent pericardiocentesis with an unknown etiology. EP consulted for PPM evaluation (no acute interventions) for EKG: NSR w/ 1st deg AVB and intermittent LBBB, however discontinued home Coreg and MO int has improved from 300 to 120s. Pericardial drain was removed on 3/1 with evidence of skin irritation/wound formation; applied bacitracin ointment to the affected area. PT evaluation taken on 2/28 with recommendation for YAMIL. Pt was stable for stepdown on 3/1 awaiting YAMIL placement.     INTERIM EVENTS: MERCEDEZ    SUBJECTIVE:  Patient seen and examined at bedside.  ROS: Patient denies h/n/v/d, fever, chills, cp, palpitations, sob, abd pain, leg swelling, rashes, dysuria, and changes in BM.     Vital Signs Last 12 Hrs  T(F): 98.7 (03-01-23 @ 13:00), Max: 98.9 (03-01-23 @ 10:00)  HR: 70 (03-01-23 @ 15:00) (60 - 84)  BP: 133/61 (03-01-23 @ 15:00) (112/56 - 143/64)  BP(mean): 88 (03-01-23 @ 15:00) (80 - 96)  RR: 26 (03-01-23 @ 15:00) (12 - 32)  SpO2: 96% (03-01-23 @ 15:00) (94% - 100%)  I&O's Summary    28 Feb 2023 07:01  -  01 Mar 2023 07:00  --------------------------------------------------------  IN: 200 mL / OUT: 839 mL / NET: -639 mL    01 Mar 2023 07:01  -  01 Mar 2023 15:40  --------------------------------------------------------  IN: 250 mL / OUT: 100 mL / NET: 150 mL        PHYSICAL EXAM:  Constitutional: NAD, comfortable in bed.  HEENT: NC/AT, PERRLA, EOMI, no conjunctival pallor or scleral icterus, MMM  Neck: Supple, no JVD  Respiratory: CTA B/L. No w/r/r.   Cardiovascular: RRR, normal S1 and S2, no m/r/g.   Gastrointestinal: +BS, soft NTND, no guarding or rebound tenderness, no palpable masses   Extremities: wwp; no cyanosis, clubbing or edema.   Vascular: Pulses equal and strong throughout.   Neurological: AAOx3, no CN deficits, strength and sensation intact throughout.   Skin: No gross skin abnormalities or rashes        LABS:                        8.5    7.61  )-----------( 133      ( 01 Mar 2023 05:30 )             26.3     03-01    137  |  105  |  45<H>  ----------------------------<  139<H>  4.5   |  24  |  2.19<H>    Ca    9.6      01 Mar 2023 05:30  Phos  3.8     03-01  Mg     2.2     03-01    TPro  6.8  /  Alb  2.8<L>  /  TBili  0.4  /  DBili  x   /  AST  16  /  ALT  10  /  AlkPhos  52  03-01            RADIOLOGY & ADDITIONAL TESTS:    MEDICATIONS  (STANDING):  atorvastatin 20 milliGRAM(s) Oral at bedtime  bacitracin/polymyxin B Ointment 1 Application(s) Topical daily  chlorhexidine 2% Cloths 1 Application(s) Topical <User Schedule>  dextrose 5%. 1000 milliLiter(s) (100 mL/Hr) IV Continuous <Continuous>  dextrose 5%. 1000 milliLiter(s) (50 mL/Hr) IV Continuous <Continuous>  dextrose 50% Injectable 25 Gram(s) IV Push once  dextrose 50% Injectable 12.5 Gram(s) IV Push once  dextrose 50% Injectable 25 Gram(s) IV Push once  enoxaparin Injectable 30 milliGRAM(s) SubCutaneous every 24 hours  glucagon  Injectable 1 milliGRAM(s) IntraMuscular once  influenza  Vaccine (HIGH DOSE) 0.7 milliLiter(s) IntraMuscular once  insulin lispro (ADMELOG) corrective regimen sliding scale   SubCutaneous Before meals and at bedtime  NIFEdipine XL 60 milliGRAM(s) Oral every 12 hours  pantoprazole    Tablet 40 milliGRAM(s) Oral before breakfast  sodium chloride 0.45%. 1000 milliLiter(s) (250 mL/Hr) IV Continuous <Continuous>    MEDICATIONS  (PRN):  acetaminophen     Tablet .. 650 milliGRAM(s) Oral every 6 hours PRN Temp greater or equal to 38C (100.4F), Mild Pain (1 - 3), Moderate Pain (4 - 6)  dextrose Oral Gel 15 Gram(s) Oral once PRN Blood Glucose LESS THAN 70 milliGRAM(s)/deciliter   ---------------------------------TRANSFER FROM [facility] TO [facility]-------------------------------------------    Patient is a 84 y/o F PMH CHF, HTN, DM, PVD, stage 3CKD, CHF, CATE (~4L O2 at home) gout, recurrent pericardial effusion, who presented to [facility] for weakness, near syncopal episode, decreased appetite, dizziness, warmth, and nausea, reporting fingerstick at home 49mg/dl, 39mg/dL, then 29 mg/dL, admitted to [place] for hypoglycemia w associated lightheadedness and SOB. Pt developed tachypnea in ED w PE showing BL crackles. Bedside echo at [place] showed a large pericardial effusion w/ echocardiographic signs of tamponade, now s/p pericardiocentesis w/ ~850cc of serous fluid drained. Drain kept in place w reducing drainage daily. Repeat TTE w trivial effusion w pericardial fluid analysis showing Plasma cells (w anemia, borderline Ca levels) promoting MM workup w sending of SPEP (pending results). Of note the patient had an episode of pericardial effusion in 07/2021 in [place] and underwent pericardiocentesis with an unknown etiology. 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 Pericardial drain was removed on 3/1 with evidence of skin irritation/wound formation; applied bacitracin ointment to the affected area. PT evaluation taken on 2/28 with recommendation for YAMIL. Pt was stable for stepdown on 3/1 awaiting YAMIL placement.     INTERIM EVENTS: MERCEDEZ    SUBJECTIVE:  Patient seen and examined at bedside.  ROS: Patient denies h/n/v/d, fever, chills, cp, palpitations, sob, abd pain, leg swelling, rashes, dysuria, and changes in BM.     Vital Signs Last 12 Hrs  T(F): 98.7 (03-01-23 @ 13:00), Max: 98.9 (03-01-23 @ 10:00)  HR: 70 (03-01-23 @ 15:00) (60 - 84)  BP: 133/61 (03-01-23 @ 15:00) (112/56 - 143/64)  BP(mean): 88 (03-01-23 @ 15:00) (80 - 96)  RR: 26 (03-01-23 @ 15:00) (12 - 32)  SpO2: 96% (03-01-23 @ 15:00) (94% - 100%)  I&O's Summary    28 Feb 2023 07:01  -  01 Mar 2023 07:00  --------------------------------------------------------  IN: 200 mL / OUT: 839 mL / NET: -639 mL    01 Mar 2023 07:01  -  01 Mar 2023 15:40  --------------------------------------------------------  IN: 250 mL / OUT: 100 mL / NET: 150 mL        PHYSICAL EXAM:  Constitutional: NAD, comfortable in bed.  HEENT: NC/AT, PERRLA, EOMI, no conjunctival pallor or scleral icterus, MMM  Neck: Supple, no JVD  Respiratory: CTA B/L. No w/r/r.   Cardiovascular: RRR, normal S1 and S2, no m/r/g.   Gastrointestinal: +BS, soft, mild distentionoted, no guarding or rebound tenderness, no palpable masses   Extremities: wwp; no cyanosis, clubbing or edema.   Vascular: Pulses equal and strong throughout.   Neurological: AAOx3, no CN deficits, strength and sensation intact throughout.   Skin: No gross skin abnormalities or rashes. Site of recently removed pericardial drain clean.         LABS:                        8.5    7.61  )-----------( 133      ( 01 Mar 2023 05:30 )             26.3     03-01    137  |  105  |  45<H>  ----------------------------<  139<H>  4.5   |  24  |  2.19<H>    Ca    9.6      01 Mar 2023 05:30  Phos  3.8     03-01  Mg     2.2     03-01    TPro  6.8  /  Alb  2.8<L>  /  TBili  0.4  /  DBili  x   /  AST  16  /  ALT  10  /  AlkPhos  52  03-01            RADIOLOGY & ADDITIONAL TESTS:    MEDICATIONS  (STANDING):  atorvastatin 20 milliGRAM(s) Oral at bedtime  bacitracin/polymyxin B Ointment 1 Application(s) Topical daily  chlorhexidine 2% Cloths 1 Application(s) Topical <User Schedule>  dextrose 5%. 1000 milliLiter(s) (100 mL/Hr) IV Continuous <Continuous>  dextrose 5%. 1000 milliLiter(s) (50 mL/Hr) IV Continuous <Continuous>  dextrose 50% Injectable 25 Gram(s) IV Push once  dextrose 50% Injectable 12.5 Gram(s) IV Push once  dextrose 50% Injectable 25 Gram(s) IV Push once  enoxaparin Injectable 30 milliGRAM(s) SubCutaneous every 24 hours  glucagon  Injectable 1 milliGRAM(s) IntraMuscular once  influenza  Vaccine (HIGH DOSE) 0.7 milliLiter(s) IntraMuscular once  insulin lispro (ADMELOG) corrective regimen sliding scale   SubCutaneous Before meals and at bedtime  NIFEdipine XL 60 milliGRAM(s) Oral every 12 hours  pantoprazole    Tablet 40 milliGRAM(s) Oral before breakfast  sodium chloride 0.45%. 1000 milliLiter(s) (250 mL/Hr) IV Continuous <Continuous>    MEDICATIONS  (PRN):  acetaminophen     Tablet .. 650 milliGRAM(s) Oral every 6 hours PRN Temp greater or equal to 38C (100.4F), Mild Pain (1 - 3), Moderate Pain (4 - 6)  dextrose Oral Gel 15 Gram(s) Oral once PRN Blood Glucose LESS THAN 70 milliGRAM(s)/deciliter

## 2023-03-01 NOTE — DISCHARGE NOTE PROVIDER - NSDCFUADDAPPT_GEN_ALL_CORE_FT
Please go to your follow up appointment with your cardiologist Dr. Roberta Ballard on May 12th at 10:30am, this is the earliest possible appointment. The office has been notified of your situation; they will reach out to you directly by phone when they have a sooner follow up appointment to schedule you sooner. Please call [phone] if you have further questions about this. Their address is [address], [address], [place] [address].

## 2023-03-01 NOTE — PROGRESS NOTE ADULT - ASSESSMENT
84 y/o F PMH CHF (unknown previous EF), HTN, DM, PVD, stage 3CKD, CHF, CATE on home O2 (appx 4L w inconsistent use), recurrent pericardial effusion (s/p drainage 2021) who presented to [facility] w near syncope  found to be hypoglycemia w fingersticks as low as 29 found to have pericardial effusion s/p repeat pericardial drain at [facility] w w/o of etiology for effusion with monitoring of drain output. Drain removed on 3/1 and was stable for stepdown thereafter.

## 2023-03-02 LAB
ALBUMIN SERPL ELPH-MCNC: 2.7 G/DL — LOW (ref 3.3–5)
ALP SERPL-CCNC: 60 U/L — SIGNIFICANT CHANGE UP (ref 40–120)
ALT FLD-CCNC: 12 U/L — SIGNIFICANT CHANGE UP (ref 10–45)
ANION GAP SERPL CALC-SCNC: 9 MMOL/L — SIGNIFICANT CHANGE UP (ref 5–17)
AST SERPL-CCNC: 19 U/L — SIGNIFICANT CHANGE UP (ref 10–40)
BASOPHILS # BLD AUTO: 0.01 K/UL — SIGNIFICANT CHANGE UP (ref 0–0.2)
BASOPHILS NFR BLD AUTO: 0.2 % — SIGNIFICANT CHANGE UP (ref 0–2)
BILIRUB SERPL-MCNC: 0.3 MG/DL — SIGNIFICANT CHANGE UP (ref 0.2–1.2)
BUN SERPL-MCNC: 46 MG/DL — HIGH (ref 7–23)
CALCIUM SERPL-MCNC: 9.4 MG/DL — SIGNIFICANT CHANGE UP (ref 8.4–10.5)
CHLORIDE SERPL-SCNC: 102 MMOL/L — SIGNIFICANT CHANGE UP (ref 96–108)
CO2 SERPL-SCNC: 24 MMOL/L — SIGNIFICANT CHANGE UP (ref 22–31)
CREAT SERPL-MCNC: 2.12 MG/DL — HIGH (ref 0.5–1.3)
EGFR: 22 ML/MIN/1.73M2 — LOW
EOSINOPHIL # BLD AUTO: 0.23 K/UL — SIGNIFICANT CHANGE UP (ref 0–0.5)
EOSINOPHIL NFR BLD AUTO: 4.2 % — SIGNIFICANT CHANGE UP (ref 0–6)
GLUCOSE BLDC GLUCOMTR-MCNC: 110 MG/DL — HIGH (ref 70–99)
GLUCOSE BLDC GLUCOMTR-MCNC: 142 MG/DL — HIGH (ref 70–99)
GLUCOSE BLDC GLUCOMTR-MCNC: 181 MG/DL — HIGH (ref 70–99)
GLUCOSE BLDC GLUCOMTR-MCNC: 192 MG/DL — HIGH (ref 70–99)
GLUCOSE SERPL-MCNC: 136 MG/DL — HIGH (ref 70–99)
HCT VFR BLD CALC: 28.1 % — LOW (ref 34.5–45)
HGB BLD-MCNC: 8.7 G/DL — LOW (ref 11.5–15.5)
IMM GRANULOCYTES NFR BLD AUTO: 0.5 % — SIGNIFICANT CHANGE UP (ref 0–0.9)
LYMPHOCYTES # BLD AUTO: 1.61 K/UL — SIGNIFICANT CHANGE UP (ref 1–3.3)
LYMPHOCYTES # BLD AUTO: 29.1 % — SIGNIFICANT CHANGE UP (ref 13–44)
MAGNESIUM SERPL-MCNC: 2.1 MG/DL — SIGNIFICANT CHANGE UP (ref 1.6–2.6)
MCHC RBC-ENTMCNC: 29.6 PG — SIGNIFICANT CHANGE UP (ref 27–34)
MCHC RBC-ENTMCNC: 31 GM/DL — LOW (ref 32–36)
MCV RBC AUTO: 95.6 FL — SIGNIFICANT CHANGE UP (ref 80–100)
MONOCYTES # BLD AUTO: 0.65 K/UL — SIGNIFICANT CHANGE UP (ref 0–0.9)
MONOCYTES NFR BLD AUTO: 11.7 % — SIGNIFICANT CHANGE UP (ref 2–14)
NEUTROPHILS # BLD AUTO: 3.01 K/UL — SIGNIFICANT CHANGE UP (ref 1.8–7.4)
NEUTROPHILS NFR BLD AUTO: 54.3 % — SIGNIFICANT CHANGE UP (ref 43–77)
NRBC # BLD: 0 /100 WBCS — SIGNIFICANT CHANGE UP (ref 0–0)
PHOSPHATE SERPL-MCNC: 3.6 MG/DL — SIGNIFICANT CHANGE UP (ref 2.5–4.5)
PLATELET # BLD AUTO: 173 K/UL — SIGNIFICANT CHANGE UP (ref 150–400)
POTASSIUM SERPL-MCNC: 4.5 MMOL/L — SIGNIFICANT CHANGE UP (ref 3.5–5.3)
POTASSIUM SERPL-SCNC: 4.5 MMOL/L — SIGNIFICANT CHANGE UP (ref 3.5–5.3)
PROT ?TM UR-MCNC: 48 MG/DL — HIGH (ref 0–12)
PROT ?TM UR-MCNC: 48 MG/DL — HIGH (ref 0–12)
PROT SERPL-MCNC: 6.7 G/DL — SIGNIFICANT CHANGE UP (ref 6–8.3)
RBC # BLD: 2.94 M/UL — LOW (ref 3.8–5.2)
RBC # FLD: 16.2 % — HIGH (ref 10.3–14.5)
SODIUM SERPL-SCNC: 135 MMOL/L — SIGNIFICANT CHANGE UP (ref 135–145)
WBC # BLD: 5.54 K/UL — SIGNIFICANT CHANGE UP (ref 3.8–10.5)
WBC # FLD AUTO: 5.54 K/UL — SIGNIFICANT CHANGE UP (ref 3.8–10.5)

## 2023-03-02 PROCEDURE — 71045 X-RAY EXAM CHEST 1 VIEW: CPT | Mod: 26

## 2023-03-02 PROCEDURE — 99233 SBSQ HOSP IP/OBS HIGH 50: CPT

## 2023-03-02 RX ADMIN — Medication 2: at 17:29

## 2023-03-02 RX ADMIN — PANTOPRAZOLE SODIUM 40 MILLIGRAM(S): 20 TABLET, DELAYED RELEASE ORAL at 06:36

## 2023-03-02 RX ADMIN — BACITRACIN AND POLYMYXIN B SULFATE 1 APPLICATION(S): 500; 10000 OINTMENT TOPICAL at 17:39

## 2023-03-02 RX ADMIN — Medication 60 MILLIGRAM(S): at 06:36

## 2023-03-02 RX ADMIN — ENOXAPARIN SODIUM 30 MILLIGRAM(S): 100 INJECTION SUBCUTANEOUS at 12:52

## 2023-03-02 RX ADMIN — Medication 2: at 12:53

## 2023-03-02 RX ADMIN — Medication 60 MILLIGRAM(S): at 18:23

## 2023-03-02 RX ADMIN — ATORVASTATIN CALCIUM 20 MILLIGRAM(S): 80 TABLET, FILM COATED ORAL at 22:03

## 2023-03-02 NOTE — PROGRESS NOTE ADULT - SUBJECTIVE AND OBJECTIVE BOX
OVERNIGHT EVENTS: MERCEDEZ    SUBJECTIVE:  Patient seen and examined at bedside.  ROS: Patient denies h/n/v/d, fever, chills, cp, palpitations, sob, abd pain, leg swelling, rashes, dysuria, and changes in BM. Complains of intermittent pain over where site of pleural drain used to be (Under L breast)    Vital Signs Last 12 Hrs  T(F): 97.1 (03-02-23 @ 09:00), Max: 97.1 (03-02-23 @ 09:00)  HR: 79 (03-02-23 @ 08:38) (76 - 85)  BP: 133/62 (03-02-23 @ 08:38) (133/62 - 141/65)  BP(mean): --  RR: 18 (03-02-23 @ 08:38) (14 - 18)  SpO2: 97% (03-02-23 @ 08:38) (96% - 98%)  I&O's Summary    01 Mar 2023 07:01  -  02 Mar 2023 07:00  --------------------------------------------------------  IN: 850 mL / OUT: 600 mL / NET: 250 mL        PHYSICAL EXAM:  Constitutional: NAD, comfortable in bed.  HEENT: NC/AT, PERRLA, EOMI, no conjunctival pallor or scleral icterus, MMM  Neck: Supple, no JVD  Respiratory: CTA B/L. No w/r/r.   Cardiovascular: RRR, normal S1 and S2, no m/r/g.   Gastrointestinal: +BS, soft NTND, no guarding or rebound tenderness, no palpable masses   Extremities: wwp; no cyanosis, clubbing or edema.   Vascular: Pulses equal and strong throughout.   Neurological: AAOx3, no CN deficits, strength and sensation intact throughout.   Skin: No gross skin abnormalities or rashes. Former site of pleural drain (Under L breast) clean, no drainage, discharge purulence, erythema, edema, or induration. Not TTP.         LABS:                        8.7    5.54  )-----------( 173      ( 02 Mar 2023 05:30 )             28.1     03-02    135  |  102  |  46<H>  ----------------------------<  136<H>  4.5   |  24  |  2.12<H>    Ca    9.4      02 Mar 2023 05:30  Phos  3.6     03-02  Mg     2.1     03-02    TPro  6.7  /  Alb  2.7<L>  /  TBili  0.3  /  DBili  x   /  AST  19  /  ALT  12  /  AlkPhos  60  03-02            RADIOLOGY & ADDITIONAL TESTS:    MEDICATIONS  (STANDING):  atorvastatin 20 milliGRAM(s) Oral at bedtime  bacitracin/polymyxin B Ointment 1 Application(s) Topical daily  chlorhexidine 2% Cloths 1 Application(s) Topical <User Schedule>  dextrose 5%. 1000 milliLiter(s) (100 mL/Hr) IV Continuous <Continuous>  dextrose 5%. 1000 milliLiter(s) (50 mL/Hr) IV Continuous <Continuous>  dextrose 50% Injectable 25 Gram(s) IV Push once  dextrose 50% Injectable 12.5 Gram(s) IV Push once  dextrose 50% Injectable 25 Gram(s) IV Push once  enoxaparin Injectable 30 milliGRAM(s) SubCutaneous every 24 hours  glucagon  Injectable 1 milliGRAM(s) IntraMuscular once  influenza  Vaccine (HIGH DOSE) 0.7 milliLiter(s) IntraMuscular once  insulin lispro (ADMELOG) corrective regimen sliding scale   SubCutaneous Before meals and at bedtime  NIFEdipine XL 60 milliGRAM(s) Oral every 12 hours  pantoprazole    Tablet 40 milliGRAM(s) Oral before breakfast    MEDICATIONS  (PRN):  acetaminophen     Tablet .. 650 milliGRAM(s) Oral every 6 hours PRN Temp greater or equal to 38C (100.4F), Mild Pain (1 - 3), Moderate Pain (4 - 6)  dextrose Oral Gel 15 Gram(s) Oral once PRN Blood Glucose LESS THAN 70 milliGRAM(s)/deciliter

## 2023-03-02 NOTE — PROGRESS NOTE ADULT - ASSESSMENT
82 y/o F PMH CHF (unknown previous EF), HTN, DM, PVD, stage 3CKD, CHF, CATE on home O2 (appx 4L w inconsistent use), recurrent pericardial effusion (s/p drainage 2021) who presented to [facility] w near syncope  found to be hypoglycemia w fingersticks as low as 29 found to have pericardial effusion s/p repeat pericardial drain at [facility] w w/o of etiology for effusion with monitoring of drain output. Drain removed on 3/1 and was stable for stepdown thereafter.

## 2023-03-03 LAB
ALBUMIN SERPL ELPH-MCNC: 2.9 G/DL — LOW (ref 3.3–5)
ALP SERPL-CCNC: 62 U/L — SIGNIFICANT CHANGE UP (ref 40–120)
ALT FLD-CCNC: 15 U/L — SIGNIFICANT CHANGE UP (ref 10–45)
ANION GAP SERPL CALC-SCNC: 10 MMOL/L — SIGNIFICANT CHANGE UP (ref 5–17)
AST SERPL-CCNC: 22 U/L — SIGNIFICANT CHANGE UP (ref 10–40)
BILIRUB SERPL-MCNC: 0.3 MG/DL — SIGNIFICANT CHANGE UP (ref 0.2–1.2)
BUN SERPL-MCNC: 51 MG/DL — HIGH (ref 7–23)
CALCIUM SERPL-MCNC: 9.4 MG/DL — SIGNIFICANT CHANGE UP (ref 8.4–10.5)
CHLORIDE SERPL-SCNC: 102 MMOL/L — SIGNIFICANT CHANGE UP (ref 96–108)
CO2 SERPL-SCNC: 23 MMOL/L — SIGNIFICANT CHANGE UP (ref 22–31)
CREAT SERPL-MCNC: 2.22 MG/DL — HIGH (ref 0.5–1.3)
EGFR: 21 ML/MIN/1.73M2 — LOW
GLUCOSE BLDC GLUCOMTR-MCNC: 130 MG/DL — HIGH (ref 70–99)
GLUCOSE BLDC GLUCOMTR-MCNC: 175 MG/DL — HIGH (ref 70–99)
GLUCOSE BLDC GLUCOMTR-MCNC: 366 MG/DL — HIGH (ref 70–99)
GLUCOSE BLDC GLUCOMTR-MCNC: 72 MG/DL — SIGNIFICANT CHANGE UP (ref 70–99)
GLUCOSE SERPL-MCNC: 111 MG/DL — HIGH (ref 70–99)
HCT VFR BLD CALC: 26.5 % — LOW (ref 34.5–45)
HGB BLD-MCNC: 8.6 G/DL — LOW (ref 11.5–15.5)
MAGNESIUM SERPL-MCNC: 2.2 MG/DL — SIGNIFICANT CHANGE UP (ref 1.6–2.6)
MCHC RBC-ENTMCNC: 30.1 PG — SIGNIFICANT CHANGE UP (ref 27–34)
MCHC RBC-ENTMCNC: 32.5 GM/DL — SIGNIFICANT CHANGE UP (ref 32–36)
MCV RBC AUTO: 92.7 FL — SIGNIFICANT CHANGE UP (ref 80–100)
NRBC # BLD: 0 /100 WBCS — SIGNIFICANT CHANGE UP (ref 0–0)
PHOSPHATE SERPL-MCNC: 3.4 MG/DL — SIGNIFICANT CHANGE UP (ref 2.5–4.5)
PLATELET # BLD AUTO: 170 K/UL — SIGNIFICANT CHANGE UP (ref 150–400)
POTASSIUM SERPL-MCNC: 4.7 MMOL/L — SIGNIFICANT CHANGE UP (ref 3.5–5.3)
POTASSIUM SERPL-SCNC: 4.7 MMOL/L — SIGNIFICANT CHANGE UP (ref 3.5–5.3)
PROT SERPL-MCNC: 6.7 G/DL — SIGNIFICANT CHANGE UP (ref 6–8.3)
RBC # BLD: 2.86 M/UL — LOW (ref 3.8–5.2)
RBC # FLD: 15.6 % — HIGH (ref 10.3–14.5)
SODIUM SERPL-SCNC: 135 MMOL/L — SIGNIFICANT CHANGE UP (ref 135–145)
WBC # BLD: 5 K/UL — SIGNIFICANT CHANGE UP (ref 3.8–10.5)
WBC # FLD AUTO: 5 K/UL — SIGNIFICANT CHANGE UP (ref 3.8–10.5)

## 2023-03-03 PROCEDURE — 99233 SBSQ HOSP IP/OBS HIGH 50: CPT

## 2023-03-03 PROCEDURE — 93010 ELECTROCARDIOGRAM REPORT: CPT

## 2023-03-03 RX ADMIN — Medication 60 MILLIGRAM(S): at 06:21

## 2023-03-03 RX ADMIN — ATORVASTATIN CALCIUM 20 MILLIGRAM(S): 80 TABLET, FILM COATED ORAL at 23:20

## 2023-03-03 RX ADMIN — ENOXAPARIN SODIUM 30 MILLIGRAM(S): 100 INJECTION SUBCUTANEOUS at 11:53

## 2023-03-03 RX ADMIN — BACITRACIN AND POLYMYXIN B SULFATE 1 APPLICATION(S): 500; 10000 OINTMENT TOPICAL at 12:04

## 2023-03-03 RX ADMIN — Medication 10: at 17:29

## 2023-03-03 RX ADMIN — Medication 2: at 12:57

## 2023-03-03 RX ADMIN — Medication 60 MILLIGRAM(S): at 19:58

## 2023-03-03 RX ADMIN — PANTOPRAZOLE SODIUM 40 MILLIGRAM(S): 20 TABLET, DELAYED RELEASE ORAL at 06:21

## 2023-03-03 NOTE — PROGRESS NOTE ADULT - SUBJECTIVE AND OBJECTIVE BOX
OVERNIGHT EVENTS: 100.2 Tmax rectal, no infectious sx.     SUBJECTIVE:  Patient seen and examined at bedside.  ROS: Patient denies h/n/v/d, fever, chills, cp, palpitations, sob, abd pain, leg swelling, rashes, dysuria, and changes in BM.     Vital Signs Last 12 Hrs  T(F): 97.2 (03-03-23 @ 14:31), Max: 99.2 (03-03-23 @ 10:12)  HR: 81 (03-03-23 @ 11:26) (73 - 95)  BP: 141/62 (03-03-23 @ 11:26) (132/63 - 146/65)  BP(mean): 94 (03-03-23 @ 05:35) (94 - 94)  RR: 17 (03-03-23 @ 12:15) (17 - 20)  SpO2: 95% (03-03-23 @ 12:15) (91% - 95%)  I&O's Summary    02 Mar 2023 07:01  -  03 Mar 2023 07:00  --------------------------------------------------------  IN: 300 mL / OUT: 1400 mL / NET: -1100 mL    03 Mar 2023 07:01  -  03 Mar 2023 14:42  --------------------------------------------------------  IN: 300 mL / OUT: 0 mL / NET: 300 mL        PHYSICAL EXAM:  Constitutional: NAD, comfortable in bed.  HEENT: NC/AT, PERRLA, EOMI, no conjunctival pallor or scleral icterus, MMM  Neck: Supple, no JVD  Respiratory: CTA B/L. No w/r/r.   Cardiovascular: RRR, normal S1 and S2, no m/r/g.   Gastrointestinal: +BS, soft NTND, no guarding or rebound tenderness, no palpable masses   Extremities: wwp; no cyanosis, clubbing or edema.   Vascular: Pulses equal and strong throughout.   Neurological: AAOx3, no CN deficits, strength and sensation intact throughout.   Skin: No gross skin abnormalities or rashes. Former site of pleural drain (Under L breast) clean, no drainage, discharge purulence, erythema, edema, or induration. Not TTP.       LABS:                        8.6    5.00  )-----------( 170      ( 03 Mar 2023 07:40 )             26.5     03-03    135  |  102  |  51<H>  ----------------------------<  111<H>  4.7   |  23  |  2.22<H>    Ca    9.4      03 Mar 2023 07:40  Phos  3.4     03-03  Mg     2.2     03-03    TPro  6.7  /  Alb  2.9<L>  /  TBili  0.3  /  DBili  x   /  AST  22  /  ALT  15  /  AlkPhos  62  03-03            RADIOLOGY & ADDITIONAL TESTS:    MEDICATIONS  (STANDING):  atorvastatin 20 milliGRAM(s) Oral at bedtime  bacitracin/polymyxin B Ointment 1 Application(s) Topical daily  chlorhexidine 2% Cloths 1 Application(s) Topical <User Schedule>  dextrose 5%. 1000 milliLiter(s) (100 mL/Hr) IV Continuous <Continuous>  dextrose 5%. 1000 milliLiter(s) (50 mL/Hr) IV Continuous <Continuous>  dextrose 50% Injectable 25 Gram(s) IV Push once  dextrose 50% Injectable 12.5 Gram(s) IV Push once  dextrose 50% Injectable 25 Gram(s) IV Push once  enoxaparin Injectable 30 milliGRAM(s) SubCutaneous every 24 hours  glucagon  Injectable 1 milliGRAM(s) IntraMuscular once  influenza  Vaccine (HIGH DOSE) 0.7 milliLiter(s) IntraMuscular once  insulin lispro (ADMELOG) corrective regimen sliding scale   SubCutaneous Before meals and at bedtime  NIFEdipine XL 60 milliGRAM(s) Oral every 12 hours  pantoprazole    Tablet 40 milliGRAM(s) Oral before breakfast    MEDICATIONS  (PRN):  acetaminophen     Tablet .. 650 milliGRAM(s) Oral every 6 hours PRN Temp greater or equal to 38C (100.4F), Mild Pain (1 - 3), Moderate Pain (4 - 6)  dextrose Oral Gel 15 Gram(s) Oral once PRN Blood Glucose LESS THAN 70 milliGRAM(s)/deciliter

## 2023-03-03 NOTE — DIETITIAN INITIAL EVALUATION ADULT - OTHER INFO
83F PMH recurrent pericardial effusion, CHF (per previous records HFpEF unknown EF, current LVEF grossly WNL), HTN, HLD, CATE on CPAP (intermittent use), T2DM, Vit D def, gout initially presented to [facility] near syncope presumed 2/2 hypoglycemia. TTE with evidence of pericarial effusion with RA/RV collapse, non-collapsable IVC, c/f tamponade. S/p pericardial drain and drain removal. Pending YAMIL auth    Pt seen at bedside for initial assessment. Denies nausea/vomiting at this time. Reports last bowel movement 3/1. Per chart, NKFA. endorses change in appetite PTA; endorses attempts 3 meals/day at home but during the recent 3months, she is unable to finish a meal, suspect 50-75% of EER. Verbalizes no recent weight loss. bilateral ankle edema +1. No pressure ulcers documented at this time. Angel Luis score=16. Pt with no overt signs of muscle or fat wasting. Based on ASPEN guidelines, pt does not meet criteria for malnutrition at this time. Discussed current diet order; provided pt with diet education regarding adequate PO intake, small frequent meals; amenable to education. Also provided diabetic information, answered all nutritionally pertinent questions.  Pt reports feeling somewhat hungry during hospital stay, able to complete 50% of meals. Pt reports no cultural/[place] or other food preferences. Made aware RD remains available. RD to follow up. See nutrition recommendations below.

## 2023-03-03 NOTE — PROGRESS NOTE ADULT - ASSESSMENT
82 y/o F PMH CHF (unknown previous EF), HTN, DM, PVD, stage 3CKD, CHF, CATE on home O2 (appx 4L w inconsistent use), recurrent pericardial effusion (s/p drainage 2021) who presented to [facility] w near syncope  found to be hypoglycemia w fingersticks as low as 29 found to have pericardial effusion s/p repeat pericardial drain at [facility] w w/o of etiology for effusion with monitoring of drain output. Drain removed on 3/1 and was stable for stepdown thereafter.

## 2023-03-03 NOTE — PROVIDER CONTACT NOTE (OTHER) - BACKGROUND
Patient is s/p pericardial drain removal on 3/1. L chest wound is NICOLASA, reddened moist. Wound care has been consulted.

## 2023-03-03 NOTE — DIETITIAN INITIAL EVALUATION ADULT - PERTINENT LABORATORY DATA
03-03    135  |  102  |  51<H>  ----------------------------<  111<H>  4.7   |  23  |  2.22<H>    Ca    9.4      03 Mar 2023 07:40  Phos  3.4     03-03  Mg     2.2     03-03    TPro  6.7  /  Alb  2.9<L>  /  TBili  0.3  /  DBili  x   /  AST  22  /  ALT  15  /  AlkPhos  62  03-03  POCT Blood Glucose.: 366 mg/dL (03-03-23 @ 16:48)  A1C with Estimated Average Glucose Result: 6.1 % (02-26-23 @ 10:16)

## 2023-03-03 NOTE — DIETITIAN INITIAL EVALUATION ADULT - PERTINENT MEDS FT
MEDICATIONS  (STANDING):  atorvastatin 20 milliGRAM(s) Oral at bedtime  bacitracin/polymyxin B Ointment 1 Application(s) Topical daily  chlorhexidine 2% Cloths 1 Application(s) Topical <User Schedule>  dextrose 5%. 1000 milliLiter(s) (100 mL/Hr) IV Continuous <Continuous>  dextrose 5%. 1000 milliLiter(s) (50 mL/Hr) IV Continuous <Continuous>  dextrose 50% Injectable 25 Gram(s) IV Push once  dextrose 50% Injectable 12.5 Gram(s) IV Push once  dextrose 50% Injectable 25 Gram(s) IV Push once  enoxaparin Injectable 30 milliGRAM(s) SubCutaneous every 24 hours  glucagon  Injectable 1 milliGRAM(s) IntraMuscular once  influenza  Vaccine (HIGH DOSE) 0.7 milliLiter(s) IntraMuscular once  insulin lispro (ADMELOG) corrective regimen sliding scale   SubCutaneous Before meals and at bedtime  NIFEdipine XL 60 milliGRAM(s) Oral every 12 hours  pantoprazole    Tablet 40 milliGRAM(s) Oral before breakfast    MEDICATIONS  (PRN):  acetaminophen     Tablet .. 650 milliGRAM(s) Oral every 6 hours PRN Temp greater or equal to 38C (100.4F), Mild Pain (1 - 3), Moderate Pain (4 - 6)  dextrose Oral Gel 15 Gram(s) Oral once PRN Blood Glucose LESS THAN 70 milliGRAM(s)/deciliter

## 2023-03-03 NOTE — DIETITIAN INITIAL EVALUATION ADULT - OTHER CALCULATIONS
%IBW: 136; IBW used to calculate estimated needs d/t pt being >120% IBW. Adjusted for CHF, clinical judgment. Fluids per team.

## 2023-03-03 NOTE — DIETITIAN INITIAL EVALUATION ADULT - ADD RECOMMEND
1. Continue with CONSCHO, low sodium   >>recommend switch ensure to Ensure MAX BID (300kcal, 60g pro)   2. Monitor %PO intake, diet tolerance.   >>Encourage PO intake as appropriate   3. Monitor lytes, replete prn. Monitor BG.   4. Trend weekly wts. Monitor skin integrity, s/sx GI distress.   5. Pain/BM regimen per team   6. RD diet edu reinforcement prn.   7. RD to remain available for additional nutrition interventions as needed.

## 2023-03-04 LAB
ALBUMIN SERPL ELPH-MCNC: 3.1 G/DL — LOW (ref 3.3–5)
ALP SERPL-CCNC: 69 U/L — SIGNIFICANT CHANGE UP (ref 40–120)
ALT FLD-CCNC: 18 U/L — SIGNIFICANT CHANGE UP (ref 10–45)
ANION GAP SERPL CALC-SCNC: 8 MMOL/L — SIGNIFICANT CHANGE UP (ref 5–17)
AST SERPL-CCNC: 26 U/L — SIGNIFICANT CHANGE UP (ref 10–40)
BASOPHILS # BLD AUTO: 0.02 K/UL — SIGNIFICANT CHANGE UP (ref 0–0.2)
BASOPHILS NFR BLD AUTO: 0.4 % — SIGNIFICANT CHANGE UP (ref 0–2)
BILIRUB SERPL-MCNC: 0.2 MG/DL — SIGNIFICANT CHANGE UP (ref 0.2–1.2)
BUN SERPL-MCNC: 35 MG/DL — HIGH (ref 7–23)
CALCIUM SERPL-MCNC: 9.5 MG/DL — SIGNIFICANT CHANGE UP (ref 8.4–10.5)
CHLORIDE SERPL-SCNC: 107 MMOL/L — SIGNIFICANT CHANGE UP (ref 96–108)
CO2 SERPL-SCNC: 24 MMOL/L — SIGNIFICANT CHANGE UP (ref 22–31)
CREAT SERPL-MCNC: 1.88 MG/DL — HIGH (ref 0.5–1.3)
CULTURE RESULTS: NO GROWTH — SIGNIFICANT CHANGE UP
CULTURE RESULTS: SIGNIFICANT CHANGE UP
CULTURE RESULTS: SIGNIFICANT CHANGE UP
DSDNA AB SER-ACNC: 14 IU/ML — SIGNIFICANT CHANGE UP
EGFR: 26 ML/MIN/1.73M2 — LOW
EOSINOPHIL # BLD AUTO: 0.25 K/UL — SIGNIFICANT CHANGE UP (ref 0–0.5)
EOSINOPHIL NFR BLD AUTO: 5.4 % — SIGNIFICANT CHANGE UP (ref 0–6)
GLUCOSE BLDC GLUCOMTR-MCNC: 118 MG/DL — HIGH (ref 70–99)
GLUCOSE BLDC GLUCOMTR-MCNC: 146 MG/DL — HIGH (ref 70–99)
GLUCOSE BLDC GLUCOMTR-MCNC: 153 MG/DL — HIGH (ref 70–99)
GLUCOSE BLDC GLUCOMTR-MCNC: 210 MG/DL — HIGH (ref 70–99)
GLUCOSE SERPL-MCNC: 124 MG/DL — HIGH (ref 70–99)
HCT VFR BLD CALC: 28.3 % — LOW (ref 34.5–45)
HGB BLD-MCNC: 8.9 G/DL — LOW (ref 11.5–15.5)
IMM GRANULOCYTES NFR BLD AUTO: 0.9 % — SIGNIFICANT CHANGE UP (ref 0–0.9)
LYMPHOCYTES # BLD AUTO: 1.93 K/UL — SIGNIFICANT CHANGE UP (ref 1–3.3)
LYMPHOCYTES # BLD AUTO: 41.3 % — SIGNIFICANT CHANGE UP (ref 13–44)
MAGNESIUM SERPL-MCNC: 2.1 MG/DL — SIGNIFICANT CHANGE UP (ref 1.6–2.6)
MCHC RBC-ENTMCNC: 30.1 PG — SIGNIFICANT CHANGE UP (ref 27–34)
MCHC RBC-ENTMCNC: 31.4 GM/DL — LOW (ref 32–36)
MCV RBC AUTO: 95.6 FL — SIGNIFICANT CHANGE UP (ref 80–100)
MONOCYTES # BLD AUTO: 0.74 K/UL — SIGNIFICANT CHANGE UP (ref 0–0.9)
MONOCYTES NFR BLD AUTO: 15.8 % — HIGH (ref 2–14)
NEUTROPHILS # BLD AUTO: 1.69 K/UL — LOW (ref 1.8–7.4)
NEUTROPHILS NFR BLD AUTO: 36.2 % — LOW (ref 43–77)
NRBC # BLD: 0 /100 WBCS — SIGNIFICANT CHANGE UP (ref 0–0)
PHOSPHATE SERPL-MCNC: 3.3 MG/DL — SIGNIFICANT CHANGE UP (ref 2.5–4.5)
PLATELET # BLD AUTO: 225 K/UL — SIGNIFICANT CHANGE UP (ref 150–400)
POTASSIUM SERPL-MCNC: 4.8 MMOL/L — SIGNIFICANT CHANGE UP (ref 3.5–5.3)
POTASSIUM SERPL-SCNC: 4.8 MMOL/L — SIGNIFICANT CHANGE UP (ref 3.5–5.3)
PROT SERPL-MCNC: 7.1 G/DL — SIGNIFICANT CHANGE UP (ref 6–8.3)
RBC # BLD: 2.96 M/UL — LOW (ref 3.8–5.2)
RBC # FLD: 16.3 % — HIGH (ref 10.3–14.5)
SODIUM SERPL-SCNC: 139 MMOL/L — SIGNIFICANT CHANGE UP (ref 135–145)
SPECIMEN SOURCE: SIGNIFICANT CHANGE UP
WBC # BLD: 4.67 K/UL — SIGNIFICANT CHANGE UP (ref 3.8–10.5)
WBC # FLD AUTO: 4.67 K/UL — SIGNIFICANT CHANGE UP (ref 3.8–10.5)

## 2023-03-04 PROCEDURE — 99233 SBSQ HOSP IP/OBS HIGH 50: CPT

## 2023-03-04 RX ORDER — HYDRALAZINE HCL 50 MG
50 TABLET ORAL EVERY 8 HOURS
Refills: 0 | Status: DISCONTINUED | OUTPATIENT
Start: 2023-03-04 | End: 2023-03-05

## 2023-03-04 RX ORDER — PANTOPRAZOLE SODIUM 20 MG/1
1 TABLET, DELAYED RELEASE ORAL
Qty: 0 | Refills: 0 | DISCHARGE

## 2023-03-04 RX ORDER — ASPIRIN/CALCIUM CARB/MAGNESIUM 324 MG
1 TABLET ORAL
Qty: 0 | Refills: 0 | DISCHARGE

## 2023-03-04 RX ORDER — ATORVASTATIN CALCIUM 80 MG/1
1 TABLET, FILM COATED ORAL
Qty: 0 | Refills: 0 | DISCHARGE

## 2023-03-04 RX ORDER — ALLOPURINOL 300 MG
1 TABLET ORAL
Qty: 0 | Refills: 0 | DISCHARGE

## 2023-03-04 RX ORDER — FUROSEMIDE 40 MG
1 TABLET ORAL
Qty: 0 | Refills: 0 | DISCHARGE

## 2023-03-04 RX ORDER — SITAGLIPTIN 50 MG/1
1 TABLET, FILM COATED ORAL
Qty: 0 | Refills: 0 | DISCHARGE

## 2023-03-04 RX ADMIN — ENOXAPARIN SODIUM 30 MILLIGRAM(S): 100 INJECTION SUBCUTANEOUS at 10:52

## 2023-03-04 RX ADMIN — Medication 4: at 17:55

## 2023-03-04 RX ADMIN — PANTOPRAZOLE SODIUM 40 MILLIGRAM(S): 20 TABLET, DELAYED RELEASE ORAL at 05:30

## 2023-03-04 RX ADMIN — ATORVASTATIN CALCIUM 20 MILLIGRAM(S): 80 TABLET, FILM COATED ORAL at 22:16

## 2023-03-04 RX ADMIN — CHLORHEXIDINE GLUCONATE 1 APPLICATION(S): 213 SOLUTION TOPICAL at 05:34

## 2023-03-04 RX ADMIN — Medication 50 MILLIGRAM(S): at 10:52

## 2023-03-04 RX ADMIN — Medication 2: at 12:51

## 2023-03-04 RX ADMIN — Medication 60 MILLIGRAM(S): at 17:58

## 2023-03-04 RX ADMIN — Medication 50 MILLIGRAM(S): at 17:58

## 2023-03-04 RX ADMIN — BACITRACIN AND POLYMYXIN B SULFATE 1 APPLICATION(S): 500; 10000 OINTMENT TOPICAL at 18:00

## 2023-03-04 RX ADMIN — Medication 60 MILLIGRAM(S): at 05:29

## 2023-03-04 NOTE — PROGRESS NOTE ADULT - PROBLEM SELECTOR PLAN 5
Pt with hx of HTN, (Home regimen: Nifedipine 90 Qd, Carvedilol 12.5 Q12, Enalapril 20 Q12, Hydral 50 Q8)  - c/w nifedipine 60 Q12 while inpt  - C/w Hydral 50 Q8

## 2023-03-04 NOTE — PROGRESS NOTE ADULT - ASSESSMENT
82 y/o F PMH CHF (unknown previous EF), HTN, DM, PVD, stage 3CKD, CHF, CATE on home O2 (appx 4L w inconsistent use), recurrent pericardial effusion (s/p drainage 2021) who presented to [facility] w near syncope  found to be hypoglycemia w fingersticks as low as 29 found to have pericardial effusion s/p repeat pericardial drain at [facility] w w/o of etiology for effusion with monitoring of drain output. Drain removed on 3/1 and was stable for stepdown thereafter. Pending [facility] authorization.

## 2023-03-04 NOTE — PROGRESS NOTE ADULT - SUBJECTIVE AND OBJECTIVE BOX
OVERNIGHT EVENTS: MERCEDEZ    SUBJECTIVE:  Patient seen and examined at bedside.  ROS: Patient denies h/n/v/d, fever, chills, cp, palpitations, sob, abd pain, leg swelling, rashes, dysuria, and changes in BM.     Vital Signs Last 12 Hrs  T(F): 96.8 (03-04-23 @ 09:38), Max: 98.7 (03-04-23 @ 05:21)  HR: 85 (03-04-23 @ 10:46) (84 - 89)  BP: 138/66 (03-04-23 @ 10:46) (138/66 - 185/75)  BP(mean): 94 (03-04-23 @ 06:12) (94 - 98)  RR: 18 (03-04-23 @ 10:46) (16 - 18)  SpO2: 100% (03-04-23 @ 10:46) (92% - 100%)  I&O's Summary    03 Mar 2023 07:01  -  04 Mar 2023 07:00  --------------------------------------------------------  IN: 540 mL / OUT: 1700 mL / NET: -1160 mL    04 Mar 2023 07:01  -  04 Mar 2023 11:38  --------------------------------------------------------  IN: 180 mL / OUT: 0 mL / NET: 180 mL        PHYSICAL EXAM:  Constitutional: NAD, comfortable in bed.  HEENT: NC/AT, PERRLA, EOMI, no conjunctival pallor or scleral icterus, MMM  Neck: Supple, no JVD  Respiratory: CTA B/L. No w/r/r.   Cardiovascular: RRR, normal S1 and S2, no m/r/g.   Gastrointestinal: +BS, soft NTND, no guarding or rebound tenderness, no palpable masses   Extremities: wwp; no cyanosis, clubbing or edema.   Vascular: Pulses equal and strong throughout.   Neurological: AAOx3, no CN deficits, strength and sensation intact throughout.   Skin: No gross skin abnormalities or rashes        LABS:                        8.9    4.67  )-----------( 225      ( 04 Mar 2023 07:39 )             28.3     03-04    139  |  107  |  35<H>  ----------------------------<  124<H>  4.8   |  24  |  1.88<H>    Ca    9.5      04 Mar 2023 07:39  Phos  3.3     03-04  Mg     2.1     03-04    TPro  7.1  /  Alb  3.1<L>  /  TBili  0.2  /  DBili  x   /  AST  26  /  ALT  18  /  AlkPhos  69  03-04            RADIOLOGY & ADDITIONAL TESTS:    MEDICATIONS  (STANDING):  atorvastatin 20 milliGRAM(s) Oral at bedtime  bacitracin/polymyxin B Ointment 1 Application(s) Topical daily  chlorhexidine 2% Cloths 1 Application(s) Topical <User Schedule>  dextrose 5%. 1000 milliLiter(s) (100 mL/Hr) IV Continuous <Continuous>  dextrose 5%. 1000 milliLiter(s) (50 mL/Hr) IV Continuous <Continuous>  dextrose 50% Injectable 25 Gram(s) IV Push once  dextrose 50% Injectable 12.5 Gram(s) IV Push once  dextrose 50% Injectable 25 Gram(s) IV Push once  enoxaparin Injectable 30 milliGRAM(s) SubCutaneous every 24 hours  glucagon  Injectable 1 milliGRAM(s) IntraMuscular once  hydrALAZINE 50 milliGRAM(s) Oral every 8 hours  influenza  Vaccine (HIGH DOSE) 0.7 milliLiter(s) IntraMuscular once  insulin lispro (ADMELOG) corrective regimen sliding scale   SubCutaneous Before meals and at bedtime  NIFEdipine XL 60 milliGRAM(s) Oral every 12 hours  pantoprazole    Tablet 40 milliGRAM(s) Oral before breakfast    MEDICATIONS  (PRN):  acetaminophen     Tablet .. 650 milliGRAM(s) Oral every 6 hours PRN Temp greater or equal to 38C (100.4F), Mild Pain (1 - 3), Moderate Pain (4 - 6)  dextrose Oral Gel 15 Gram(s) Oral once PRN Blood Glucose LESS THAN 70 milliGRAM(s)/deciliter

## 2023-03-05 LAB
ALBUMIN SERPL ELPH-MCNC: 3.2 G/DL — LOW (ref 3.3–5)
ALP SERPL-CCNC: 60 U/L — SIGNIFICANT CHANGE UP (ref 40–120)
ALT FLD-CCNC: 13 U/L — SIGNIFICANT CHANGE UP (ref 10–45)
ANION GAP SERPL CALC-SCNC: 13 MMOL/L — SIGNIFICANT CHANGE UP (ref 5–17)
AST SERPL-CCNC: 19 U/L — SIGNIFICANT CHANGE UP (ref 10–40)
BASOPHILS # BLD AUTO: 0.01 K/UL — SIGNIFICANT CHANGE UP (ref 0–0.2)
BASOPHILS NFR BLD AUTO: 0.2 % — SIGNIFICANT CHANGE UP (ref 0–2)
BILIRUB SERPL-MCNC: 0.2 MG/DL — SIGNIFICANT CHANGE UP (ref 0.2–1.2)
BUN SERPL-MCNC: 29 MG/DL — HIGH (ref 7–23)
CALCIUM SERPL-MCNC: 9.5 MG/DL — SIGNIFICANT CHANGE UP (ref 8.4–10.5)
CHLORIDE SERPL-SCNC: 106 MMOL/L — SIGNIFICANT CHANGE UP (ref 96–108)
CO2 SERPL-SCNC: 21 MMOL/L — LOW (ref 22–31)
CREAT SERPL-MCNC: 1.84 MG/DL — HIGH (ref 0.5–1.3)
ECV11 AB TITR SER CF: SIGNIFICANT CHANGE UP
ECV30 AB TITR SER CF: SIGNIFICANT CHANGE UP
ECV4 AB TITR SER CF: SIGNIFICANT CHANGE UP
ECV7 AB TITR SER CF: SIGNIFICANT CHANGE UP
ECV9 AB TITR SER CF: SIGNIFICANT CHANGE UP
EGFR: 27 ML/MIN/1.73M2 — LOW
EOSINOPHIL # BLD AUTO: 0.18 K/UL — SIGNIFICANT CHANGE UP (ref 0–0.5)
EOSINOPHIL NFR BLD AUTO: 3.1 % — SIGNIFICANT CHANGE UP (ref 0–6)
GLUCOSE BLDC GLUCOMTR-MCNC: 132 MG/DL — HIGH (ref 70–99)
GLUCOSE BLDC GLUCOMTR-MCNC: 149 MG/DL — HIGH (ref 70–99)
GLUCOSE BLDC GLUCOMTR-MCNC: 156 MG/DL — HIGH (ref 70–99)
GLUCOSE BLDC GLUCOMTR-MCNC: 193 MG/DL — HIGH (ref 70–99)
GLUCOSE SERPL-MCNC: 134 MG/DL — HIGH (ref 70–99)
HCT VFR BLD CALC: 26.7 % — LOW (ref 34.5–45)
HGB BLD-MCNC: 8.7 G/DL — LOW (ref 11.5–15.5)
IMM GRANULOCYTES NFR BLD AUTO: 1 % — HIGH (ref 0–0.9)
LYMPHOCYTES # BLD AUTO: 2.04 K/UL — SIGNIFICANT CHANGE UP (ref 1–3.3)
LYMPHOCYTES # BLD AUTO: 35.4 % — SIGNIFICANT CHANGE UP (ref 13–44)
MAGNESIUM SERPL-MCNC: 2 MG/DL — SIGNIFICANT CHANGE UP (ref 1.6–2.6)
MCHC RBC-ENTMCNC: 30.3 PG — SIGNIFICANT CHANGE UP (ref 27–34)
MCHC RBC-ENTMCNC: 32.6 GM/DL — SIGNIFICANT CHANGE UP (ref 32–36)
MCV RBC AUTO: 93 FL — SIGNIFICANT CHANGE UP (ref 80–100)
MONOCYTES # BLD AUTO: 0.86 K/UL — SIGNIFICANT CHANGE UP (ref 0–0.9)
MONOCYTES NFR BLD AUTO: 14.9 % — HIGH (ref 2–14)
NEUTROPHILS # BLD AUTO: 2.61 K/UL — SIGNIFICANT CHANGE UP (ref 1.8–7.4)
NEUTROPHILS NFR BLD AUTO: 45.4 % — SIGNIFICANT CHANGE UP (ref 43–77)
NRBC # BLD: 0 /100 WBCS — SIGNIFICANT CHANGE UP (ref 0–0)
PHOSPHATE SERPL-MCNC: 3.2 MG/DL — SIGNIFICANT CHANGE UP (ref 2.5–4.5)
PLATELET # BLD AUTO: 256 K/UL — SIGNIFICANT CHANGE UP (ref 150–400)
POTASSIUM SERPL-MCNC: 4.8 MMOL/L — SIGNIFICANT CHANGE UP (ref 3.5–5.3)
POTASSIUM SERPL-SCNC: 4.8 MMOL/L — SIGNIFICANT CHANGE UP (ref 3.5–5.3)
PROT SERPL-MCNC: 6.6 G/DL — SIGNIFICANT CHANGE UP (ref 6–8.3)
RBC # BLD: 2.87 M/UL — LOW (ref 3.8–5.2)
RBC # FLD: 16.4 % — HIGH (ref 10.3–14.5)
SODIUM SERPL-SCNC: 140 MMOL/L — SIGNIFICANT CHANGE UP (ref 135–145)
WBC # BLD: 5.76 K/UL — SIGNIFICANT CHANGE UP (ref 3.8–10.5)
WBC # FLD AUTO: 5.76 K/UL — SIGNIFICANT CHANGE UP (ref 3.8–10.5)

## 2023-03-05 PROCEDURE — 99232 SBSQ HOSP IP/OBS MODERATE 35: CPT

## 2023-03-05 RX ORDER — HYDRALAZINE HCL 50 MG
50 TABLET ORAL EVERY 8 HOURS
Refills: 0 | Status: DISCONTINUED | OUTPATIENT
Start: 2023-03-05 | End: 2023-03-06

## 2023-03-05 RX ADMIN — Medication 60 MILLIGRAM(S): at 07:03

## 2023-03-05 RX ADMIN — Medication 2: at 22:56

## 2023-03-05 RX ADMIN — Medication 650 MILLIGRAM(S): at 09:16

## 2023-03-05 RX ADMIN — ENOXAPARIN SODIUM 30 MILLIGRAM(S): 100 INJECTION SUBCUTANEOUS at 11:48

## 2023-03-05 RX ADMIN — Medication 50 MILLIGRAM(S): at 02:48

## 2023-03-05 RX ADMIN — Medication 60 MILLIGRAM(S): at 17:21

## 2023-03-05 RX ADMIN — Medication 50 MILLIGRAM(S): at 11:47

## 2023-03-05 RX ADMIN — Medication 2: at 17:20

## 2023-03-05 RX ADMIN — ATORVASTATIN CALCIUM 20 MILLIGRAM(S): 80 TABLET, FILM COATED ORAL at 22:56

## 2023-03-05 RX ADMIN — CHLORHEXIDINE GLUCONATE 1 APPLICATION(S): 213 SOLUTION TOPICAL at 06:57

## 2023-03-05 RX ADMIN — Medication 50 MILLIGRAM(S): at 17:22

## 2023-03-05 RX ADMIN — PANTOPRAZOLE SODIUM 40 MILLIGRAM(S): 20 TABLET, DELAYED RELEASE ORAL at 07:04

## 2023-03-05 RX ADMIN — Medication 650 MILLIGRAM(S): at 10:18

## 2023-03-05 NOTE — PROGRESS NOTE ADULT - SUBJECTIVE AND OBJECTIVE BOX
SUBJECTIVE:    Patient seen and examined at bedside. No acute complaints. Denies fever, headache, chest pain, sob, abdominal pain, n/v/d     OBJECTIVE:  Vital Signs Last 24 Hrs  T(C): 37.8 (05 Mar 2023 08:31), Max: 37.8 (05 Mar 2023 08:31)  T(F): 100.1 (05 Mar 2023 08:31), Max: 100.1 (05 Mar 2023 08:31)  HR: 96 (05 Mar 2023 08:16) (85 - 97)  BP: 153/65 (05 Mar 2023 08:16) (138/66 - 165/72)  BP(mean): 102 (05 Mar 2023 07:45) (97 - 120)  RR: 16 (05 Mar 2023 08:16) (16 - 19)  SpO2: 97% (05 Mar 2023 08:16) (97% - 100%)    Parameters below as of 05 Mar 2023 08:16  Patient On (Oxygen Delivery Method): room air        Physical Exam:  Constitutional: NAD, comfortable in bed.  HEENT: NC/AT, PERRLA, EOMI, no conjunctival pallor or scleral icterus, MMM  Neck: Supple, no JVD  Respiratory: CTA B/L. No w/r/r.   Cardiovascular: RRR, normal S1 and S2, no m/r/g.   Gastrointestinal: +BS, soft NTND, no guarding or rebound tenderness, no palpable masses   Extremities: wwp; no cyanosis, clubbing or edema.   Vascular: Pulses equal and strong throughout.   Neurological: AAOx3, no CN deficits, strength and sensation intact throughout.   Skin: No gross skin abnormalities or rashes    Labs:                        8.7    5.76  )-----------( 256      ( 05 Mar 2023 07:55 )             26.7     03-05    140  |  106  |  29<H>  ----------------------------<  134<H>  4.8   |  21<L>  |  1.84<H>    Ca    9.5      05 Mar 2023 07:55  Phos  3.2     03-05  Mg     2.0     03-05    TPro  6.6  /  Alb  3.2<L>  /  TBili  0.2  /  DBili  x   /  AST  19  /  ALT  13  /  AlkPhos  60  03-05      Fingerstick  glucose: POCT Blood Glucose.: 149 mg/dL (05 Mar 2023 06:50)      MEDICATIONS  (STANDING):  atorvastatin 20 milliGRAM(s) Oral at bedtime  bacitracin/polymyxin B Ointment 1 Application(s) Topical daily  chlorhexidine 2% Cloths 1 Application(s) Topical <User Schedule>  dextrose 5%. 1000 milliLiter(s) (100 mL/Hr) IV Continuous <Continuous>  dextrose 5%. 1000 milliLiter(s) (50 mL/Hr) IV Continuous <Continuous>  dextrose 50% Injectable 25 Gram(s) IV Push once  dextrose 50% Injectable 12.5 Gram(s) IV Push once  dextrose 50% Injectable 25 Gram(s) IV Push once  enoxaparin Injectable 30 milliGRAM(s) SubCutaneous every 24 hours  glucagon  Injectable 1 milliGRAM(s) IntraMuscular once  hydrALAZINE 50 milliGRAM(s) Oral every 8 hours  influenza  Vaccine (HIGH DOSE) 0.7 milliLiter(s) IntraMuscular once  insulin lispro (ADMELOG) corrective regimen sliding scale   SubCutaneous Before meals and at bedtime  NIFEdipine XL 60 milliGRAM(s) Oral every 12 hours  pantoprazole    Tablet 40 milliGRAM(s) Oral before breakfast    MEDICATIONS  (PRN):  acetaminophen     Tablet .. 650 milliGRAM(s) Oral every 6 hours PRN Temp greater or equal to 38C (100.4F), Mild Pain (1 - 3), Moderate Pain (4 - 6)  dextrose Oral Gel 15 Gram(s) Oral once PRN Blood Glucose LESS THAN 70 milliGRAM(s)/deciliter      Allergies    Allergy Status Unknown    Intolerances        RADIOLOGY & ADDITIONAL TESTS: Reviewed.

## 2023-03-05 NOTE — PROGRESS NOTE ADULT - ASSESSMENT
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 S/p repeat pericardial drain at [facility] w/ drain removal on 3/1. Pending [facility] authorization.

## 2023-03-05 NOTE — PROGRESS NOTE ADULT - ATTENDING COMMENTS
83F w/ pmh of HTN, HLD, DM, CATE on home 02, ?dCHF, h/o pericardial effusion requiring drainage ~1yr ago. Patient initially presented to [facility] on 2/25 w/ near syncope - found to be hypoglycemic (FS 29), further w/u showed large pericardial effusion, tx'd to [facility] CCU for further mgmt    -Pericardial effusion - Large pericardial effusion w/ echocardiographic signs of tamponade; Clinically HD stable w/ SBP 140s-160s, HR 70s-80s and asymptomatic. Presenting symptom of near syncope likely attributable to hypoglycemia with those sx resolving after dextrose administration; However, despite current HD stability, repeat TTE showed increase in size of effusion. Pt will go for pericardiocentesis today w/ drain to be left in place; Currently etiology is unclear, no recent viral illness, f/u autoimmune studies, consider malignancy w/u and will f/u fluid studies  -Pulm - CATE; CPAP overnight; Currently non-hypoxic, able to lie flat w/o issue  -DASH diet  -DVT PPx  -Dispo: CCU  -Full Code    Josh Blankenship MD  CCU Attending
83F w/ pmh of HTN, HLD, DM, CATE on home 02, ?dCHF, h/o pericardial effusion requiring drainage ~1yr ago. Patient initially presented to [facility] on 2/25 w/ near syncope - found to be hypoglycemic (FS 29), further w/u showed large pericardial effusion, tx'd to [facility] CCU for further mgmt    -Pericardial effusion - Large pericardial effusion w/ echocardiographic signs of tamponade; Now s/p pericardiocentesis w/ ~850cc of seroud fluid drained. Drain kept in place w/ addition ~200cc drained in the past 24 hrs. Clinically HD stable w/ SBP 140s-160s, HR 70s-80s and asymptomatic. Plan to repeat TTE today; Currently etiology is unclear, no recent viral illness, f/u autoimmune studies, consider malignancy w/u and will f/u fluid studies  -EP - Age-related conduction disease; EKG: NSR w/ marked 1st deg AVB and intermittent LBBB. Consult EP for PPM evaluation. Will also obtain collateral concerning any recent ischemic w/u. If none, will likely perform nuclear stress after pericardial drain has been removed; Pt.'s home Coreg has been dc'd.  -Pulm - CATE; Pt declining CPAP; Currently non-hypoxic, able to lie flat w/o issue  -DASH diet  -DVT PPx  -Dispo: CCU  -Full Code    Josh Blankenship MD  CCU Attending
83F w/ pmh of HTN, HLD, DM, CATE on home 02, ?dCHF, h/o pericardial effusion requiring drainage ~1yr ago. Patient initially presented to [facility] on 2/25 w/ near syncope - found to be hypoglycemic (FS 29), further w/u showed large pericardial effusion, tx'd to [facility] CCU for further mgmt    -Pericardial effusion - Large pericardial effusion w/ echocardiographic signs of tamponade; Now s/p pericardiocentesis w/ ~850cc of serous fluid drained. Drain kept in place w/ additional ~460cc drained in the past 48 hrs. Clinically HD stable w/ SBP 140s-160s, HR 70s-80s and asymptomatic. Repeat TTE today shows trivial effusion though patient continues to have steady drainange; Currently etiology is unclear - Pericardial fluid did have Plasma cells - will send SPEP, UPEP and NITIN  -EP - Age-related conduction disease; EKG: NSR w/ 1st deg AVB and intermittent LBBB. Consulted EP for PPM evaluation - no acute indication for PPM at this time. Discontinued home Coreg and [place] int has improved from 300 to 190s; Will continue to monitor  -Regular diet; Encourage PO intake  -OOB to chair / PT / Incentive Spirometer   -DVT PPx  -Dispo: CCU  -Full Code    Josh Blankenship MD  CCU Attending
83F w/ pmh of HTN, HLD, DM, CATE on home 02, ?dCHF, h/o pericardial effusion requiring drainage ~1yr ago. Patient initially presented to [facility] on 2/25 w/ near syncope - found to be hypoglycemic (FS 29), further w/u showed large pericardial effusion, tx'd to [facility] CCU for further mgmt    -Pericardial effusion - Large pericardial effusion w/ echocardiographic signs of tamponade; Now s/p pericardiocentesis w/ ~850cc of serous fluid drained. Drained only 90cc/last 24 hrs - drain now dc'd, Total drainage ~1400cc. Remains HD stable w/ SBP 130s-140s, HR 70s-80s and asymptomatic. Repeat TTE today shows no effusion  -EP - Age-related conduction disease; EKG: NSR w/ 1st deg AVB and intermittent LBBB. Consulted EP for PPM evaluation - no acute indication for PPM at this time. Discontinued home Coreg and [place] int has improved from 300 to 190s; Will continue to monitor  -Regular diet; Encourage PO intake  -OOB to chair / PT / Incentive Spirometer   -DVT PPx  -Dispo: SD to Cardiac Tele  -Full Code    Josh Blankenship MD  CCU Attending
83F w/ pmh of HTN, HLD, DM, CATE on home 02, ?dCHF, p/w large pericardial effusion to [facility], now s/p pericardiocentesis. stepped down to Cardiac Tele    -Pericardial effusion - Large pericardial effusion w/ echocardiographic signs of tamponade; Now s/p pericardiocentesis w/ ~850cc of serous fluid drained. Total drainage ~1400cc. Pericardial drain dc'd on 3/1; Remains HD stable w/ SBP 130s-140s, HR 70s-80s and asymptomatic. Repeat TTE today shows no effusion  -EP - Age-related conduction disease; EKG: NSR w/ 1st deg AVB and intermittent LBBB. Consulted EP for PPM evaluation - no acute indication for PPM at this time. Discontinued home Coreg and [place] int has improved from 300 to 190s; Will continue to monitor  -HTN - Outpatient Coreg dc'd d/t conduction disease; Home Nifedipine 60 po increased to BID - w/ good BP control  -Regular diet  -DVT PPx  -OOB to chair / PT / Incentive Spirometer  -Dispo: Cardiac Tele; Awaiting YAMIL Placement  -Full Code    Josh Blankenship MD
Patient seen and examined. Case discussed with resident team    No complaints today. Denies cp, sob  BPs elevated  Labs stable hgb, Cr improving  taken off BB bc of concern for conduction disease  med rec for home BP meds, will need further med titration  repeat tte with trace pericardial effusion  dispo pending YAMIL De Leon MD
Patient seen and examined. Case discussed with resident team    No complaints today. Denies cp, sob  BPs still above goal  Labs stable hgb, Cr stable  taken off BB bc of concern for conduction disease  BP: no acei/arb due to JENNIFER; restarted home hydral but didn't get AM dose - monitor on home dose, continue nifedipine  repeat tte with trace pericardial effusion  dispo pending YAMIL De Leon MD

## 2023-03-05 NOTE — PROGRESS NOTE ADULT - TIME BILLING
83F w/ pmh of HTN, HLD, DM, CATE on home 02, ?dCHF, p/w large pericardial effusion to [facility], now s/p pericardiocentesis. stepped down to Cardiac Tele    -Pericardial effusion - Large pericardial effusion w/ echocardiographic signs of tamponade; Now s/p pericardiocentesis w/ ~850cc of serous fluid drained. Total drainage ~1400cc. Pericardial drain dc'd on 3/1; Remains HD stable w/ SBP 130s-140s, HR 70s-80s and asymptomatic. Repeat TTE today shows no effusion  -EP - Age-related conduction disease; EKG: NSR w/ 1st deg AVB and intermittent LBBB. Consulted EP for PPM evaluation - no acute indication for PPM at this time. Discontinued home Coreg and [place] int has improved from 300 to 190s; Will continue to monitor  -HTN - Outpatient Coreg dc'd d/t conduction disease; Home Nifedipine 60 po increased to BID - w/ good BP control  -Regular diet; Encourage PO intake  -OOB to chair / PT / Incentive Spirometer   -DVT PPx  -Dispo: Cardiac Tele; Awaiting YAMIL Placement  -Full Code    Josh Blankenship MD  Cardiology Attending
as above
as above

## 2023-03-06 LAB
GLUCOSE BLDC GLUCOMTR-MCNC: 123 MG/DL — HIGH (ref 70–99)
GLUCOSE BLDC GLUCOMTR-MCNC: 141 MG/DL — HIGH (ref 70–99)
GLUCOSE BLDC GLUCOMTR-MCNC: 147 MG/DL — HIGH (ref 70–99)
GLUCOSE BLDC GLUCOMTR-MCNC: 166 MG/DL — HIGH (ref 70–99)
GLUCOSE BLDC GLUCOMTR-MCNC: 179 MG/DL — HIGH (ref 70–99)

## 2023-03-06 PROCEDURE — 93010 ELECTROCARDIOGRAM REPORT: CPT

## 2023-03-06 PROCEDURE — 99232 SBSQ HOSP IP/OBS MODERATE 35: CPT

## 2023-03-06 RX ORDER — NIFEDIPINE 30 MG
1 TABLET, EXTENDED RELEASE 24 HR ORAL
Qty: 0 | Refills: 0 | DISCHARGE
Start: 2023-03-06

## 2023-03-06 RX ORDER — HYDRALAZINE HCL 50 MG
75 TABLET ORAL EVERY 8 HOURS
Refills: 0 | Status: DISCONTINUED | OUTPATIENT
Start: 2023-03-06 | End: 2023-03-07

## 2023-03-06 RX ORDER — NIFEDIPINE 30 MG
1 TABLET, EXTENDED RELEASE 24 HR ORAL
Qty: 0 | Refills: 0 | DISCHARGE

## 2023-03-06 RX ADMIN — ENOXAPARIN SODIUM 30 MILLIGRAM(S): 100 INJECTION SUBCUTANEOUS at 11:31

## 2023-03-06 RX ADMIN — PANTOPRAZOLE SODIUM 40 MILLIGRAM(S): 20 TABLET, DELAYED RELEASE ORAL at 07:32

## 2023-03-06 RX ADMIN — Medication 60 MILLIGRAM(S): at 19:10

## 2023-03-06 RX ADMIN — ATORVASTATIN CALCIUM 20 MILLIGRAM(S): 80 TABLET, FILM COATED ORAL at 22:57

## 2023-03-06 RX ADMIN — Medication 60 MILLIGRAM(S): at 07:32

## 2023-03-06 RX ADMIN — BACITRACIN AND POLYMYXIN B SULFATE 1 APPLICATION(S): 500; 10000 OINTMENT TOPICAL at 11:33

## 2023-03-06 RX ADMIN — Medication 75 MILLIGRAM(S): at 11:31

## 2023-03-06 RX ADMIN — Medication 50 MILLIGRAM(S): at 01:02

## 2023-03-06 RX ADMIN — Medication 75 MILLIGRAM(S): at 19:10

## 2023-03-06 RX ADMIN — Medication 2: at 23:36

## 2023-03-06 NOTE — PROGRESS NOTE ADULT - PROBLEM SELECTOR PLAN 9
Hgb 8.8 likely from CKD. No signs of active bleeding. Denies melena, hematuria, or hemoptysis.    Plan:  - Maintain active T&S  - Transfuse hgb<7

## 2023-03-06 NOTE — PROGRESS NOTE ADULT - PROBLEM SELECTOR PROBLEM 4
PVD (peripheral vascular disease)

## 2023-03-06 NOTE — PROGRESS NOTE ADULT - PROBLEM SELECTOR PLAN 2
Pt with hx of CHF, unknown last EF. On home carvedilol 12.5 mg BID, enalapril 10mg. BNP and trop WNL at [facility]. Not in current exacerbation. Extremities WWP.  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 Patient w/o bradycardia or high degree block on telemetry. Presenting w trop 0.03 denies CP. Patient w/o sx of CP, SOB, lightheadedness.  - Patient w no known ischemic history   - D/c'd Coreg with improvement of 1st Degree AVB on updated EGC  - EP consulted w/o need for intervention
Pt with hx of CHF, unknown last EF. (Home regimen: Nifedipine 90 Qd, Carvedilol 12.5 Q12, Enalapril 20 Q12, Hydral 50 Q8). BNP and trop WNL at [place]. Not in current exacerbation. Extremities WWP. Hydral increased to 75Q8 on 3/6 iso persistent HTN despite BP regimen.  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 Patient w/o bradycardia or high degree block on telemetry. Presenting w trop 0.03 denies CP. Patient w/o sx of CP, SOB, lightheadedness.  - Patient w no known ischemic history   - D/c'd Coreg with improvement of 1st Degree AVB on updated EGC  - EP consulted; w/o need for intervention
Pt with hx of CHF, unknown last EF. (Home regimen: Nifedipine 90 Qd, Carvedilol 12.5 Q12, Enalapril 20 Q12, Hydral 50 Q8). BNP and trop WNL at [place]. Not in current exacerbation. Extremities WWP.  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 Patient w/o bradycardia or high degree block on telemetry. Presenting w trop 0.03 denies CP. Patient w/o sx of CP, SOB, lightheadedness.  - Patient w no known ischemic history   - D/c'd Coreg with improvement of 1st Degree AVB on updated EGC  - EP consulted w/o need for intervention
Pt with hx of CHF, unknown last EF. On home carvedilol 12.5 mg BID, enalapril 10mg. BNP and trop WNL at [facility]. Not in current exacerbation. Extremities WWP.  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 Patient w/o bradycardia or high degree block on telemetry. Presenting w trop 0.03 denies CP. Patient w/o sx of CP, SOB, lightheadedness.  - Patient w no known ischemic history   - D/c'd Coreg with improvement of 1st Degree AVB on updated EGC  - EP consulted w/o need for intervention
Pt with hx of CHF, unknown last EF. On home carvedilol 12.5 mg BID, enalapril 10mg. BNP and trop WNL at [facility]. Not in current exacerbation. Extremities WWP.  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 Patient w/o bradycardia or high degree block on telemetry. Presenting w trop 0.03 denies CP. Patient w/o sx of CP, SOB, lightheadedness.  - Patient w no known ischemic history   - D/c'd Coreg with improvement of 1st Degree AVB on updated EGC  - EP consulted w/o need for intervention
Pt with hx of CHF, unknown last EF. (Home regimen: Nifedipine 90 Qd, Carvedilol 12.5 Q12, Enalapril 20 Q12, Hydral 50 Q8). BNP and trop WNL at [place]. Not in current exacerbation. Extremities WWP.  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 Patient w/o bradycardia or high degree block on telemetry. Presenting w trop 0.03 denies CP. Patient w/o sx of CP, SOB, lightheadedness.  - Patient w no known ischemic history   - D/c'd Coreg with improvement of 1st Degree AVB on updated EGC  - EP consulted w/o need for intervention

## 2023-03-06 NOTE — PROGRESS NOTE ADULT - PROBLEM SELECTOR PLAN 3
Pt with Cr 1.8 on admission, increasing on admission   - Maintenance fluids PRN  - trend BUN/Cr daily  - Encourage PO intake

## 2023-03-06 NOTE — PROGRESS NOTE ADULT - PROBLEM SELECTOR PLAN 4
Hx of LE PVD. On [id number] and Atorvastatin 20mg. Per pt, no hx of stents. No current symptoms w elevated cholesterol and LDL, decreased HDL   -c/w Atorvastatin 20mg qd
Hx of LE PVD. On ASA 81 Qd and Atorvastatin 20 Qd. Per pt, no hx of stents. No current symptoms w elevated cholesterol and LDL, decreased HDL  - c/w ASA 81mg    - c/w Atorvastatin 20mg qd
Hx of LE PVD. On ASA 81 Qd and Atorvastatin 20 Qd. Per pt, no hx of stents. No current symptoms w elevated cholesterol and LDL, decreased HDL   -c/w Atorvastatin 20mg qd
Hx of LE PVD. On [id number] and Atorvastatin 20mg. Per pt, no hx of stents. No current symptoms w elevated cholesterol and LDL, decreased HDL   -c/w Atorvastatin 20mg qd
Hx of LE PVD. On [id number] and Atorvastatin 20mg. Per pt, no hx of stents. No current symptoms w elevated cholesterol and LDL, decreased HDL   -c/w Atorvastatin 20mg qd
Hx of LE PVD. On ASA 81 Qd and Atorvastatin 20 Qd. Per pt, no hx of stents. No current symptoms w elevated cholesterol and LDL, decreased HDL  - c/w ASA 81mg    - c/w Atorvastatin 20mg qd

## 2023-03-06 NOTE — PROGRESS NOTE ADULT - PROBLEM SELECTOR PLAN 6
Pt with hx of CATE, on home O2, around 4L (inconsistently)   - Satting well on RA w BIPAP at night   - c/w Incentive spirometer

## 2023-03-06 NOTE — PROGRESS NOTE ADULT - PROBLEM SELECTOR PROBLEM 1
Pericardial effusion

## 2023-03-06 NOTE — PROGRESS NOTE ADULT - PROBLEM SELECTOR PLAN 8
Admitted to [facility] for hypoglycemia to reported 49mg/dl, 39mg/dL, then 29 mg/dL on home finger stick. On home glipizide 10mg qd, Sitagliptin 50mg outpt.     - C/w sliding scale lispro  - Monitor FSG q6

## 2023-03-06 NOTE — PROGRESS NOTE ADULT - PROBLEM SELECTOR PLAN 10
F: PRN  E: replete prn, particular attention to hypokalemia   N: Regular Diet + Ensure Supplementation   DVT: Lovenox   - PT rec'd YAMIL, patient lives by herself with support of her daughter/grandaughter. She is open to YAMIL  Code; Full

## 2023-03-06 NOTE — PROGRESS NOTE ADULT - PROBLEM SELECTOR PLAN 7
Pt with hx of GERD, on home pantoprazole 40mg daily  -c.w pantoprazole 40mg daily

## 2023-03-06 NOTE — PROGRESS NOTE ADULT - ASSESSMENT
83F PMH CHF (unknown previous EF), HTN, DM, PVD, CKD3, ACTE (on home 4L O2), recurrent pericardial effusion (s/p drainage 2021) who presented to [facility] w near syncope, found to have pericardial effusion. S/p repeat pericardial drain at [facility] w/ drain removal on 3/1. Pending [facility] authorization.

## 2023-03-06 NOTE — PROGRESS NOTE ADULT - PROBLEM SELECTOR PROBLEM 6
CATE (obstructive sleep apnea)

## 2023-03-06 NOTE — PROGRESS NOTE ADULT - PROBLEM SELECTOR PLAN 1
s/p pericardiocentesis in 07/2021 in [place]. On home lasix 40mg BID. Bedside echo 2/26: IVC collapsable, RA early diastolic collapse, early cardiac tamponade physiology. Echo 2/27: limited 2/2 pericardial effusion with grossly normal LV function w interval drainage of pericardial effusion compared to 2/26. Small Pleural Effusions on CXR. TTE 2/27 w trivial effusion w grossly normal EF   - S/p pericardial drain placement w/ output 89ml/24 hour period including 9cc o/n (reduced from prior 24 hour period); drain removed on 3/1.   - Pericardial fluid initially saw plasma cells, proceeded w MM workup (concurrent anemia, borderline Ca levels) awaiting SPEP, UPEP and NITIN results (specimen received)  - Continue to hold lasix, UOP net negative  - c/w OOBTC   - c/w Incentive spirometer  - Medically ready for DC, pend YAMIL
s/p pericardiocentesis in 07/2021 in [place]. On home lasix 40mg BID. Bedside echo 2/26: IVC collapsable, RA early diastolic collapse, early cardiac tamponade physiology. Echo 2/27: limited 2/2 pericardial effusion with grossly normal LV function w interval drainage of pericardial effusion compared to 2/26. Small Pleural Effusions on CXR. TTE 2/27 w trivial effusion w grossly normal EF   - S/p pericardial drain placement w/ output 89ml/24 hour period including 9cc o/n (reduced from prior 24 hour period); drain removed on 3/1.   - Pericardial fluid initially saw plasma cells, proceeded w MM workup (concurrent anemia, borderline Ca levels) awaiting SPEP, UPEP and NITIN results (specimen received)  - Continue to hold lasix, UOP net negative  - c/w OOBTC   - c/w Incentive spirometer  - Medically ready for DC, pend YAMIL
s/p pericardiocentesis in 07/2021 in [place]. On home lasix 40mg BID. Bedside echo 2/26: IVC collapsable, RA early diastolic collapse, early cardiac tamponade physiology. Echo 2/27: limited 2/2 pericardial effusion with grossly normal LV function w interval drainage of pericardial effusion compared to 2/26. Small Pleural Effusions on CXR. TTE 2/27 w trivial effusion w grossly normal EF   - S/p pericardial drain placement w/ output 89ml/24 hour period including 9cc o/n (reduced from prior 24 hour period); drain removed on 3/1.   - Pericardial fluid initially saw plasma cells, c/w MM workup (concurrent anemia, borderline Ca levels)  - Continue to hold lasix  - c/w OOBTC   - c/w Incentive spirometer  - Medically ready for DC, pend YAMIL
s/p pericardiocentesis in 07/2021 in [place]. On home lasix 40mg BID. Bedside echo 2/26: IVC collapsable, RA early diastolic collapse, early cardiac tamponade physiology. Echo 2/27: limited 2/2 pericardial effusion with grossly normal LV function w interval drainage of pericardial effusion compared to 2/26. Small Pleural Effusions on CXR. TTE 2/27 w trivial effusion w grossly normal EF   - S/p pericardial drain placement w/ output 89ml/24 hour period including 9cc o/n (reduced from prior 24 hour period); drain removed on 3/1.   - Pericardial fluid initially saw plasma cells, proceeded w MM workup (concurrent anemia, borderline Ca levels) awaiting SPEP, UPEP and NITIN results (specimen received)  - Continue to hold lasix, UOP net negative  - c/w OOBTC   - c/w Incentive spirometer  - Medically ready for DC, pend YAMIL
s/p pericardiocentesis in 07/2021 in [place]. On home lasix 40mg BID. Bedside echo 2/26: IVC collapsable, RA early diastolic collapse, early cardiac tamponade physiology. Echo 2/27: limited 2/2 pericardial effusion with grossly normal LV function w interval drainage of pericardial effusion compared to 2/26. Small Pleural Effusions on CXR. TTE 2/27 w trivial effusion w grossly normal EF   - S/p pericardial drain placement w/ output 89ml/24 hour period including 9cc o/n (reduced from prior 24 hour period); drain removed on 3/1.   - Pericardial fluid initially saw plasma cells, c/w MM workup (concurrent anemia, borderline Ca levels)  - Continue to hold lasix  - c/w OOBTC   - c/w Incentive spirometer  - Medically ready for DC, pend YAMIL
s/p pericardiocentesis in 07/2021 in [place]. On home lasix 40mg BID. Bedside echo 2/26: IVC collapsable, RA early diastolic collapse, early cardiac tamponade physiology. Echo 2/27: limited 2/2 pericardial effusion with grossly normal LV function w interval drainage of pericardial effusion compared to 2/26. Small Pleural Effusions on CXR. TTE 2/27 w trivial effusion w grossly normal EF   - S/p pericardial drain placement w/ output 89ml/24 hour period including 9cc o/n (reduced from prior 24 hour period); drain removed on 3/1.   - Pericardial fluid initially saw plasma cells, proceeded w MM workup (concurrent anemia, borderline Ca levels) awaiting SPEP, UPEP and NITIN results (specimen received)  - Continue to hold lasix, UOP net negative  - c/w OOBTC   - c/w Incentive spirometer

## 2023-03-06 NOTE — PROGRESS NOTE ADULT - SUBJECTIVE AND OBJECTIVE BOX
OVERNIGHT EVENTS: MERCEDEZ    SUBJECTIVE:  Patient seen and examined at bedside.  ROS: Patient denies h/n/v/d, fever, chills, cp, palpitations, sob, abd pain, leg swelling, rashes, dysuria, and changes in BM. Pt states her pain over the previous pericardial drain site (Inferior to L breast) has resolved.     Vital Signs Last 12 Hrs  T(F): 98.1 (03-06-23 @ 08:43), Max: 98.8 (03-06-23 @ 04:20)  HR: 102 (03-06-23 @ 08:18) (87 - 102)  BP: 149/67 (03-06-23 @ 08:18) (142/63 - 154/66)  BP(mean): 96 (03-06-23 @ 08:18) (96 - 96)  RR: 18 (03-06-23 @ 08:18) (17 - 18)  SpO2: 99% (03-06-23 @ 08:18) (97% - 99%)  I&O's Summary    05 Mar 2023 07:01  -  06 Mar 2023 07:00  --------------------------------------------------------  IN: 810 mL / OUT: 1300 mL / NET: -490 mL    06 Mar 2023 07:01  -  06 Mar 2023 11:17  --------------------------------------------------------  IN: 180 mL / OUT: 0 mL / NET: 180 mL        PHYSICAL EXAM:  Constitutional: NAD, comfortable in bed.  HEENT: NC/AT, PERRLA, EOMI, no conjunctival pallor or scleral icterus, MMM  Neck: Supple, no JVD  Respiratory: CTA B/L. No w/r/r.   Cardiovascular: RRR, normal S1 and S2, no m/r/g.   Gastrointestinal: +BS, soft NTND, no guarding or rebound tenderness, no palpable masses   Extremities: wwp; trace PE noted over ankles b/l.   Vascular: Pulses equal and strong throughout.   Neurological: AAOx3, no CN deficits, strength and sensation intact throughout.   Skin: No gross skin abnormalities or rashes        LABS:                        8.7    5.76  )-----------( 256      ( 05 Mar 2023 07:55 )             26.7     03-05    140  |  106  |  29<H>  ----------------------------<  134<H>  4.8   |  21<L>  |  1.84<H>    Ca    9.5      05 Mar 2023 07:55  Phos  3.2     03-05  Mg     2.0     03-05    TPro  6.6  /  Alb  3.2<L>  /  TBili  0.2  /  DBili  x   /  AST  19  /  ALT  13  /  AlkPhos  60  03-05            RADIOLOGY & ADDITIONAL TESTS:    MEDICATIONS  (STANDING):  atorvastatin 20 milliGRAM(s) Oral at bedtime  bacitracin/polymyxin B Ointment 1 Application(s) Topical daily  dextrose 5%. 1000 milliLiter(s) (50 mL/Hr) IV Continuous <Continuous>  dextrose 5%. 1000 milliLiter(s) (100 mL/Hr) IV Continuous <Continuous>  dextrose 50% Injectable 25 Gram(s) IV Push once  dextrose 50% Injectable 12.5 Gram(s) IV Push once  dextrose 50% Injectable 25 Gram(s) IV Push once  enoxaparin Injectable 30 milliGRAM(s) SubCutaneous every 24 hours  glucagon  Injectable 1 milliGRAM(s) IntraMuscular once  hydrALAZINE 75 milliGRAM(s) Oral every 8 hours  influenza  Vaccine (HIGH DOSE) 0.7 milliLiter(s) IntraMuscular once  insulin lispro (ADMELOG) corrective regimen sliding scale   SubCutaneous Before meals and at bedtime  NIFEdipine XL 60 milliGRAM(s) Oral every 12 hours  pantoprazole    Tablet 40 milliGRAM(s) Oral before breakfast    MEDICATIONS  (PRN):  acetaminophen     Tablet .. 650 milliGRAM(s) Oral every 6 hours PRN Temp greater or equal to 38C (100.4F), Mild Pain (1 - 3), Moderate Pain (4 - 6)  dextrose Oral Gel 15 Gram(s) Oral once PRN Blood Glucose LESS THAN 70 milliGRAM(s)/deciliter

## 2023-03-07 VITALS
OXYGEN SATURATION: 97 % | SYSTOLIC BLOOD PRESSURE: 164 MMHG | RESPIRATION RATE: 18 BRPM | HEART RATE: 106 BPM | DIASTOLIC BLOOD PRESSURE: 70 MMHG

## 2023-03-07 LAB
% GAMMA, URINE: 11.2 % — SIGNIFICANT CHANGE UP
ALBUMIN 24H MFR UR ELPH: 29.6 % — SIGNIFICANT CHANGE UP
ALBUMIN SERPL ELPH-MCNC: 3.2 G/DL — LOW (ref 3.3–5)
ALP SERPL-CCNC: 58 U/L — SIGNIFICANT CHANGE UP (ref 40–120)
ALPHA1 GLOB 24H MFR UR ELPH: 29.6 % — SIGNIFICANT CHANGE UP
ALPHA2 GLOB 24H MFR UR ELPH: 15 % — SIGNIFICANT CHANGE UP
ALT FLD-CCNC: 16 U/L — SIGNIFICANT CHANGE UP (ref 10–45)
ANION GAP SERPL CALC-SCNC: 9 MMOL/L — SIGNIFICANT CHANGE UP (ref 5–17)
AST SERPL-CCNC: 26 U/L — SIGNIFICANT CHANGE UP (ref 10–40)
B-GLOBULIN 24H MFR UR ELPH: 14.6 % — SIGNIFICANT CHANGE UP
BASOPHILS # BLD AUTO: 0.03 K/UL — SIGNIFICANT CHANGE UP (ref 0–0.2)
BASOPHILS NFR BLD AUTO: 0.5 % — SIGNIFICANT CHANGE UP (ref 0–2)
BILIRUB SERPL-MCNC: 0.3 MG/DL — SIGNIFICANT CHANGE UP (ref 0.2–1.2)
BUN SERPL-MCNC: 23 MG/DL — SIGNIFICANT CHANGE UP (ref 7–23)
CALCIUM SERPL-MCNC: 9.1 MG/DL — SIGNIFICANT CHANGE UP (ref 8.4–10.5)
CHLORIDE SERPL-SCNC: 105 MMOL/L — SIGNIFICANT CHANGE UP (ref 96–108)
CK MB BLD-MCNC: NEGATIVE TITER — SIGNIFICANT CHANGE UP
CO2 SERPL-SCNC: 22 MMOL/L — SIGNIFICANT CHANGE UP (ref 22–31)
COXSACKIE TYPE A-24: HIGH TITER
CREAT SERPL-MCNC: 1.74 MG/DL — HIGH (ref 0.5–1.3)
CV A24 IGG TITR SER IF: HIGH TITER
CV A7 AB SER-ACNC: NEGATIVE TITER — SIGNIFICANT CHANGE UP
CV A9 AB TITR FLD: NEGATIVE TITER — SIGNIFICANT CHANGE UP
CV B1 AB TITR FLD: NEGATIVE — SIGNIFICANT CHANGE UP
CV B2 AB TITR FLD: NEGATIVE — SIGNIFICANT CHANGE UP
CV B3 AB TITR FLD: NEGATIVE — SIGNIFICANT CHANGE UP
CV B4 AB TITR FLD: NEGATIVE — SIGNIFICANT CHANGE UP
CV B5 AB TITR FLD: NEGATIVE — SIGNIFICANT CHANGE UP
CV B6 AB TITR FLD: NEGATIVE — SIGNIFICANT CHANGE UP
EGFR: 28 ML/MIN/1.73M2 — LOW
EOSINOPHIL # BLD AUTO: 0.17 K/UL — SIGNIFICANT CHANGE UP (ref 0–0.5)
EOSINOPHIL NFR BLD AUTO: 2.8 % — SIGNIFICANT CHANGE UP (ref 0–6)
GLUCOSE BLDC GLUCOMTR-MCNC: 124 MG/DL — HIGH (ref 70–99)
GLUCOSE BLDC GLUCOMTR-MCNC: 167 MG/DL — HIGH (ref 70–99)
GLUCOSE SERPL-MCNC: 127 MG/DL — HIGH (ref 70–99)
HCT VFR BLD CALC: 26.6 % — LOW (ref 34.5–45)
HGB BLD-MCNC: 8.6 G/DL — LOW (ref 11.5–15.5)
IMM GRANULOCYTES NFR BLD AUTO: 1 % — HIGH (ref 0–0.9)
INTERPRETATION 24H UR IFE-IMP: SIGNIFICANT CHANGE UP
INTERPRETATION 24H UR IFE-IMP: SIGNIFICANT CHANGE UP
LYMPHOCYTES # BLD AUTO: 2.35 K/UL — SIGNIFICANT CHANGE UP (ref 1–3.3)
LYMPHOCYTES # BLD AUTO: 38.3 % — SIGNIFICANT CHANGE UP (ref 13–44)
M PROTEIN 24H UR ELPH-MRATE: SIGNIFICANT CHANGE UP
MAGNESIUM SERPL-MCNC: 1.9 MG/DL — SIGNIFICANT CHANGE UP (ref 1.6–2.6)
MCHC RBC-ENTMCNC: 30.1 PG — SIGNIFICANT CHANGE UP (ref 27–34)
MCHC RBC-ENTMCNC: 32.3 GM/DL — SIGNIFICANT CHANGE UP (ref 32–36)
MCV RBC AUTO: 93 FL — SIGNIFICANT CHANGE UP (ref 80–100)
MONOCYTES # BLD AUTO: 0.99 K/UL — HIGH (ref 0–0.9)
MONOCYTES NFR BLD AUTO: 16.2 % — HIGH (ref 2–14)
NEUTROPHILS # BLD AUTO: 2.53 K/UL — SIGNIFICANT CHANGE UP (ref 1.8–7.4)
NEUTROPHILS NFR BLD AUTO: 41.2 % — LOW (ref 43–77)
NRBC # BLD: 0 /100 WBCS — SIGNIFICANT CHANGE UP (ref 0–0)
PHOSPHATE SERPL-MCNC: 3.6 MG/DL — SIGNIFICANT CHANGE UP (ref 2.5–4.5)
PLATELET # BLD AUTO: 296 K/UL — SIGNIFICANT CHANGE UP (ref 150–400)
POTASSIUM SERPL-MCNC: 4.6 MMOL/L — SIGNIFICANT CHANGE UP (ref 3.5–5.3)
POTASSIUM SERPL-SCNC: 4.6 MMOL/L — SIGNIFICANT CHANGE UP (ref 3.5–5.3)
PROT PATTERN 24H UR ELPH-IMP: SIGNIFICANT CHANGE UP
PROT SERPL-MCNC: 6.6 G/DL — SIGNIFICANT CHANGE UP (ref 6–8.3)
RBC # BLD: 2.86 M/UL — LOW (ref 3.8–5.2)
RBC # FLD: 16.7 % — HIGH (ref 10.3–14.5)
SODIUM SERPL-SCNC: 136 MMOL/L — SIGNIFICANT CHANGE UP (ref 135–145)
WBC # BLD: 6.13 K/UL — SIGNIFICANT CHANGE UP (ref 3.8–10.5)
WBC # FLD AUTO: 6.13 K/UL — SIGNIFICANT CHANGE UP (ref 3.8–10.5)

## 2023-03-07 PROCEDURE — 85045 AUTOMATED RETICULOCYTE COUNT: CPT

## 2023-03-07 PROCEDURE — 97116 GAIT TRAINING THERAPY: CPT

## 2023-03-07 PROCEDURE — 82962 GLUCOSE BLOOD TEST: CPT

## 2023-03-07 PROCEDURE — 82570 ASSAY OF URINE CREATININE: CPT

## 2023-03-07 PROCEDURE — 86901 BLOOD TYPING SEROLOGIC RH(D): CPT

## 2023-03-07 PROCEDURE — 36415 COLL VENOUS BLD VENIPUNCTURE: CPT

## 2023-03-07 PROCEDURE — 84550 ASSAY OF BLOOD/URIC ACID: CPT

## 2023-03-07 PROCEDURE — 97162 PT EVAL MOD COMPLEX 30 MIN: CPT

## 2023-03-07 PROCEDURE — 84145 PROCALCITONIN (PCT): CPT

## 2023-03-07 PROCEDURE — 87040 BLOOD CULTURE FOR BACTERIA: CPT

## 2023-03-07 PROCEDURE — 82784 ASSAY IGA/IGD/IGG/IGM EACH: CPT

## 2023-03-07 PROCEDURE — 81001 URINALYSIS AUTO W/SCOPE: CPT

## 2023-03-07 PROCEDURE — 80061 LIPID PANEL: CPT

## 2023-03-07 PROCEDURE — 86900 BLOOD TYPING SEROLOGIC ABO: CPT

## 2023-03-07 PROCEDURE — 86038 ANTINUCLEAR ANTIBODIES: CPT

## 2023-03-07 PROCEDURE — 86334 IMMUNOFIX E-PHORESIS SERUM: CPT

## 2023-03-07 PROCEDURE — 83615 LACTATE (LD) (LDH) ENZYME: CPT

## 2023-03-07 PROCEDURE — 85730 THROMBOPLASTIN TIME PARTIAL: CPT

## 2023-03-07 PROCEDURE — 84300 ASSAY OF URINE SODIUM: CPT

## 2023-03-07 PROCEDURE — 88305 TISSUE EXAM BY PATHOLOGIST: CPT

## 2023-03-07 PROCEDURE — 85025 COMPLETE CBC W/AUTO DIFF WBC: CPT

## 2023-03-07 PROCEDURE — C1769: CPT

## 2023-03-07 PROCEDURE — 82553 CREATINE MB FRACTION: CPT

## 2023-03-07 PROCEDURE — 88112 CYTOPATH CELL ENHANCE TECH: CPT

## 2023-03-07 PROCEDURE — 84155 ASSAY OF PROTEIN SERUM: CPT

## 2023-03-07 PROCEDURE — 84443 ASSAY THYROID STIM HORMONE: CPT

## 2023-03-07 PROCEDURE — 84466 ASSAY OF TRANSFERRIN: CPT

## 2023-03-07 PROCEDURE — 71045 X-RAY EXAM CHEST 1 VIEW: CPT

## 2023-03-07 PROCEDURE — 99239 HOSP IP/OBS DSCHRG MGMT >30: CPT

## 2023-03-07 PROCEDURE — C8923: CPT

## 2023-03-07 PROCEDURE — 82945 GLUCOSE OTHER FLUID: CPT

## 2023-03-07 PROCEDURE — 94660 CPAP INITIATION&MGMT: CPT

## 2023-03-07 PROCEDURE — 84484 ASSAY OF TROPONIN QUANT: CPT

## 2023-03-07 PROCEDURE — 80053 COMPREHEN METABOLIC PANEL: CPT

## 2023-03-07 PROCEDURE — 97530 THERAPEUTIC ACTIVITIES: CPT

## 2023-03-07 PROCEDURE — C1729: CPT

## 2023-03-07 PROCEDURE — 86225 DNA ANTIBODY NATIVE: CPT

## 2023-03-07 PROCEDURE — 84157 ASSAY OF PROTEIN OTHER: CPT

## 2023-03-07 PROCEDURE — 87075 CULTR BACTERIA EXCEPT BLOOD: CPT

## 2023-03-07 PROCEDURE — 87086 URINE CULTURE/COLONY COUNT: CPT

## 2023-03-07 PROCEDURE — 97110 THERAPEUTIC EXERCISES: CPT

## 2023-03-07 PROCEDURE — 82728 ASSAY OF FERRITIN: CPT

## 2023-03-07 PROCEDURE — 93010 ELECTROCARDIOGRAM REPORT: CPT

## 2023-03-07 PROCEDURE — 84540 ASSAY OF URINE/UREA-N: CPT

## 2023-03-07 PROCEDURE — 82550 ASSAY OF CK (CPK): CPT

## 2023-03-07 PROCEDURE — 83935 ASSAY OF URINE OSMOLALITY: CPT

## 2023-03-07 PROCEDURE — 82042 OTHER SOURCE ALBUMIN QUAN EA: CPT

## 2023-03-07 PROCEDURE — 93321 DOPPLER ECHO F-UP/LMTD STD: CPT

## 2023-03-07 PROCEDURE — 84166 PROTEIN E-PHORESIS/URINE/CSF: CPT

## 2023-03-07 PROCEDURE — 84100 ASSAY OF PHOSPHORUS: CPT

## 2023-03-07 PROCEDURE — 83540 ASSAY OF IRON: CPT

## 2023-03-07 PROCEDURE — 86658 ENTEROVIRUS ANTIBODY: CPT

## 2023-03-07 PROCEDURE — 85610 PROTHROMBIN TIME: CPT

## 2023-03-07 PROCEDURE — 89051 BODY FLUID CELL COUNT: CPT

## 2023-03-07 PROCEDURE — 84156 ASSAY OF PROTEIN URINE: CPT

## 2023-03-07 PROCEDURE — 83036 HEMOGLOBIN GLYCOSYLATED A1C: CPT

## 2023-03-07 PROCEDURE — 87205 SMEAR GRAM STAIN: CPT

## 2023-03-07 PROCEDURE — 84165 PROTEIN E-PHORESIS SERUM: CPT

## 2023-03-07 PROCEDURE — C1894: CPT

## 2023-03-07 PROCEDURE — 85027 COMPLETE CBC AUTOMATED: CPT

## 2023-03-07 PROCEDURE — 84480 ASSAY TRIIODOTHYRONINE (T3): CPT

## 2023-03-07 PROCEDURE — 87102 FUNGUS ISOLATION CULTURE: CPT

## 2023-03-07 PROCEDURE — 93005 ELECTROCARDIOGRAM TRACING: CPT

## 2023-03-07 PROCEDURE — 86431 RHEUMATOID FACTOR QUANT: CPT

## 2023-03-07 PROCEDURE — 83605 ASSAY OF LACTIC ACID: CPT

## 2023-03-07 PROCEDURE — 0225U NFCT DS DNA&RNA 21 SARSCOV2: CPT

## 2023-03-07 PROCEDURE — 86850 RBC ANTIBODY SCREEN: CPT

## 2023-03-07 PROCEDURE — 83735 ASSAY OF MAGNESIUM: CPT

## 2023-03-07 PROCEDURE — 83550 IRON BINDING TEST: CPT

## 2023-03-07 PROCEDURE — 87070 CULTURE OTHR SPECIMN AEROBIC: CPT

## 2023-03-07 PROCEDURE — 84436 ASSAY OF TOTAL THYROXINE: CPT

## 2023-03-07 PROCEDURE — 82787 IGG 1 2 3 OR 4 EACH: CPT

## 2023-03-07 RX ORDER — HYDRALAZINE HCL 50 MG
1 TABLET ORAL
Qty: 0 | Refills: 0 | DISCHARGE

## 2023-03-07 RX ORDER — CARVEDILOL PHOSPHATE 80 MG/1
1 CAPSULE, EXTENDED RELEASE ORAL
Qty: 0 | Refills: 0 | DISCHARGE

## 2023-03-07 RX ORDER — HYDRALAZINE HCL 50 MG
3 TABLET ORAL
Qty: 0 | Refills: 0 | DISCHARGE

## 2023-03-07 RX ADMIN — Medication 60 MILLIGRAM(S): at 13:11

## 2023-03-07 RX ADMIN — Medication 75 MILLIGRAM(S): at 13:11

## 2023-03-07 RX ADMIN — BACITRACIN AND POLYMYXIN B SULFATE 1 APPLICATION(S): 500; 10000 OINTMENT TOPICAL at 14:24

## 2023-03-07 RX ADMIN — ENOXAPARIN SODIUM 30 MILLIGRAM(S): 100 INJECTION SUBCUTANEOUS at 13:11

## 2023-03-07 RX ADMIN — PANTOPRAZOLE SODIUM 40 MILLIGRAM(S): 20 TABLET, DELAYED RELEASE ORAL at 07:44

## 2023-03-07 RX ADMIN — Medication 75 MILLIGRAM(S): at 04:56

## 2023-03-07 NOTE — DISCHARGE NOTE NURSING/CASE MANAGEMENT/SOCIAL WORK - NSDCFUADDAPPT_GEN_ALL_CORE_FT
Please go to your follow up appointment with your cardiologist Dr. Roberta Ballard on May 12th at 10:30am, this is the earliest possible appointment. The office has been notified of your situation; they will reach out to you directly by phone when they have a sooner follow up appointment to schedule you sooner. Please call [phone] if you have further questions about this. Their address is [address], [address], [place] [address].

## 2023-03-07 NOTE — DISCHARGE NOTE NURSING/CASE MANAGEMENT/SOCIAL WORK - PATIENT PORTAL LINK FT
You can access the FollowMyHealth Patient Portal offered by [facility] by registering at the following website: [email or web address]. By joining [employer]’s FollowMyHealth portal, you will also be able to view your health information using other applications (apps) compatible with our system.

## 2023-03-08 LAB
% ALBUMIN: 46.3 % — SIGNIFICANT CHANGE UP
% ALBUMIN: 46.6 % — SIGNIFICANT CHANGE UP
% ALPHA 1: 6.7 % — SIGNIFICANT CHANGE UP
% ALPHA 1: 6.9 % — SIGNIFICANT CHANGE UP
% ALPHA 2: 13.3 % — SIGNIFICANT CHANGE UP
% ALPHA 2: 13.6 % — SIGNIFICANT CHANGE UP
% BETA: 16.2 % — SIGNIFICANT CHANGE UP
% BETA: 16.5 % — SIGNIFICANT CHANGE UP
% GAMMA: 16.7 % — SIGNIFICANT CHANGE UP
% GAMMA: 17.2 % — SIGNIFICANT CHANGE UP
% M SPIKE: SIGNIFICANT CHANGE UP
% M SPIKE: SIGNIFICANT CHANGE UP
ALBUMIN SERPL ELPH-MCNC: 2.8 G/DL — LOW (ref 3.6–5.5)
ALBUMIN SERPL ELPH-MCNC: 3.1 G/DL — LOW (ref 3.6–5.5)
ALBUMIN/GLOB SERPL ELPH: 0.8 RATIO — SIGNIFICANT CHANGE UP
ALBUMIN/GLOB SERPL ELPH: 0.9 RATIO — SIGNIFICANT CHANGE UP
ALPHA1 GLOB SERPL ELPH-MCNC: 0.4 G/DL — SIGNIFICANT CHANGE UP (ref 0.1–0.4)
ALPHA1 GLOB SERPL ELPH-MCNC: 0.4 G/DL — SIGNIFICANT CHANGE UP (ref 0.1–0.4)
ALPHA2 GLOB SERPL ELPH-MCNC: 0.8 G/DL — SIGNIFICANT CHANGE UP (ref 0.5–1)
ALPHA2 GLOB SERPL ELPH-MCNC: 0.9 G/DL — SIGNIFICANT CHANGE UP (ref 0.5–1)
B-GLOBULIN SERPL ELPH-MCNC: 1 G/DL — SIGNIFICANT CHANGE UP (ref 0.5–1)
B-GLOBULIN SERPL ELPH-MCNC: 1.1 G/DL — HIGH (ref 0.5–1)
GAMMA GLOBULIN: 1 G/DL — SIGNIFICANT CHANGE UP (ref 0.6–1.6)
GAMMA GLOBULIN: 1.1 G/DL — SIGNIFICANT CHANGE UP (ref 0.6–1.6)
INTERPRETATION SERPL IFE-IMP: SIGNIFICANT CHANGE UP
INTERPRETATION SERPL IFE-IMP: SIGNIFICANT CHANGE UP
M-SPIKE: SIGNIFICANT CHANGE UP (ref 0–0)
M-SPIKE: SIGNIFICANT CHANGE UP (ref 0–0)
PROT PATTERN SERPL ELPH-IMP: SIGNIFICANT CHANGE UP
PROT PATTERN SERPL ELPH-IMP: SIGNIFICANT CHANGE UP

## 2023-03-14 LAB — IGG4 SER-MCNC: 25 MG/DL — SIGNIFICANT CHANGE UP (ref 2–96)

## 2023-03-20 DIAGNOSIS — N18.30 CHRONIC KIDNEY DISEASE, STAGE 3 UNSPECIFIED: ICD-10-CM

## 2023-03-20 DIAGNOSIS — Z87.891 PERSONAL HISTORY OF NICOTINE DEPENDENCE: ICD-10-CM

## 2023-03-20 DIAGNOSIS — E11.51 TYPE 2 DIABETES MELLITUS WITH DIABETIC PERIPHERAL ANGIOPATHY WITHOUT GANGRENE: ICD-10-CM

## 2023-03-20 DIAGNOSIS — I31.39 OTHER PERICARDIAL EFFUSION (NONINFLAMMATORY): ICD-10-CM

## 2023-03-20 DIAGNOSIS — I44.7 LEFT BUNDLE-BRANCH BLOCK, UNSPECIFIED: ICD-10-CM

## 2023-03-20 DIAGNOSIS — E87.6 HYPOKALEMIA: ICD-10-CM

## 2023-03-20 DIAGNOSIS — E11.22 TYPE 2 DIABETES MELLITUS WITH DIABETIC CHRONIC KIDNEY DISEASE: ICD-10-CM

## 2023-03-20 DIAGNOSIS — N17.9 ACUTE KIDNEY FAILURE, UNSPECIFIED: ICD-10-CM

## 2023-03-20 DIAGNOSIS — I31.4 CARDIAC TAMPONADE: ICD-10-CM

## 2023-03-20 DIAGNOSIS — D63.1 ANEMIA IN CHRONIC KIDNEY DISEASE: ICD-10-CM

## 2023-03-20 DIAGNOSIS — I44.0 ATRIOVENTRICULAR BLOCK, FIRST DEGREE: ICD-10-CM

## 2023-03-20 DIAGNOSIS — I13.0 HYPERTENSIVE HEART AND CHRONIC KIDNEY DISEASE WITH HEART FAILURE AND STAGE 1 THROUGH STAGE 4 CHRONIC KIDNEY DISEASE, OR UNSPECIFIED CHRONIC KIDNEY DISEASE: ICD-10-CM

## 2023-03-20 DIAGNOSIS — G47.33 OBSTRUCTIVE SLEEP APNEA (ADULT) (PEDIATRIC): ICD-10-CM

## 2023-03-20 DIAGNOSIS — K21.9 GASTRO-ESOPHAGEAL REFLUX DISEASE WITHOUT ESOPHAGITIS: ICD-10-CM

## 2023-03-20 DIAGNOSIS — I50.22 CHRONIC SYSTOLIC (CONGESTIVE) HEART FAILURE: ICD-10-CM

## 2023-03-20 DIAGNOSIS — M10.9 GOUT, UNSPECIFIED: ICD-10-CM

## 2023-03-20 DIAGNOSIS — R77.8 OTHER SPECIFIED ABNORMALITIES OF PLASMA PROTEINS: ICD-10-CM

## 2023-03-20 DIAGNOSIS — E78.5 HYPERLIPIDEMIA, UNSPECIFIED: ICD-10-CM

## 2023-03-20 DIAGNOSIS — E11.649 TYPE 2 DIABETES MELLITUS WITH HYPOGLYCEMIA WITHOUT COMA: ICD-10-CM

## 2023-03-29 LAB
CULTURE RESULTS: SIGNIFICANT CHANGE UP
SPECIMEN SOURCE: SIGNIFICANT CHANGE UP

## 2023-12-06 NOTE — DISCHARGE NOTE PROVIDER - NSDCCPCAREPLAN_GEN_ALL_CORE_FT
PRINCIPAL DISCHARGE DIAGNOSIS  Diagnosis: Pericardial effusion  Assessment and Plan of Treatment: Pericardial effusion ([id number]) is the buildup of too much fluid in the double-layered, saclike structure around the heart (pericardium). The space between these layers typically contains a thin layer of fluid. This is not the first time this has happened to you, and this explains why you were feeling the symtpoms you were feeling when you first came to the hospital. While you were in the hospital, we placed a pericardial drain toremove the fluid that had built up in the sac-like structure around your heart. Once we had removed enough fluid, we removed the drain. Since then, your symptoms seem to have greatly improved. Please continue taking your medications as prescribed, and go to your follow up appointment with your cardiologist for further asessment about why this happened and how to prevent it from happening again.       Patient seen and evaluated, ED attending note and orders reviewed, will continue with patient follow up and care -Saadia Garcia PA-C US w/o signs of infection, pt feeling somewhat better after meds, ambulating with steady gait, likely sciatica, will dc home with nsaids, muscle relaxers, ortho spine f/u, return precautions given  Saadia Garcia PA-C U/A w/o signs of infection, pt feeling somewhat better after meds, ambulating with steady gait, likely sciatica, will dc home with nsaids, muscle relaxers, ortho spine f/u, return precautions given  Saadia Garcia PA-C
